# Patient Record
Sex: MALE | Race: BLACK OR AFRICAN AMERICAN
[De-identification: names, ages, dates, MRNs, and addresses within clinical notes are randomized per-mention and may not be internally consistent; named-entity substitution may affect disease eponyms.]

---

## 2017-12-28 ENCOUNTER — HOSPITAL ENCOUNTER (EMERGENCY)
Dept: HOSPITAL 72 - EMR | Age: 62
Discharge: HOME | End: 2017-12-28
Payer: MEDICARE

## 2017-12-28 VITALS — SYSTOLIC BLOOD PRESSURE: 148 MMHG | DIASTOLIC BLOOD PRESSURE: 92 MMHG

## 2017-12-28 VITALS — DIASTOLIC BLOOD PRESSURE: 79 MMHG | SYSTOLIC BLOOD PRESSURE: 142 MMHG

## 2017-12-28 VITALS — DIASTOLIC BLOOD PRESSURE: 92 MMHG | SYSTOLIC BLOOD PRESSURE: 148 MMHG

## 2017-12-28 VITALS — WEIGHT: 310 LBS | BODY MASS INDEX: 45.91 KG/M2 | HEIGHT: 69 IN

## 2017-12-28 VITALS — DIASTOLIC BLOOD PRESSURE: 96 MMHG | SYSTOLIC BLOOD PRESSURE: 164 MMHG

## 2017-12-28 DIAGNOSIS — I10: ICD-10-CM

## 2017-12-28 DIAGNOSIS — E66.01: ICD-10-CM

## 2017-12-28 DIAGNOSIS — R56.9: ICD-10-CM

## 2017-12-28 DIAGNOSIS — R06.00: Primary | ICD-10-CM

## 2017-12-28 DIAGNOSIS — E11.9: ICD-10-CM

## 2017-12-28 DIAGNOSIS — J45.909: ICD-10-CM

## 2017-12-28 LAB
ADD MANUAL DIFF: NO
ALBUMIN SERPL-MCNC: 3.6 G/DL (ref 3.4–5)
ALBUMIN/GLOB SERPL: 0.8 {RATIO} (ref 1–2.7)
ALP SERPL-CCNC: 78 U/L (ref 46–116)
ALT SERPL-CCNC: 20 U/L (ref 12–78)
ANION GAP SERPL CALC-SCNC: 9 MMOL/L (ref 5–15)
APPEARANCE UR: (no result)
APTT PPP: YELLOW S
AST SERPL-CCNC: 13 U/L (ref 15–37)
BASOPHILS NFR BLD AUTO: 1 % (ref 0–2)
BILIRUB SERPL-MCNC: 0.4 MG/DL (ref 0.2–1)
BUN SERPL-MCNC: 10 MG/DL (ref 7–18)
CALCIUM SERPL-MCNC: 7.8 MG/DL (ref 8.5–10.1)
CHLORIDE SERPL-SCNC: 102 MMOL/L (ref 98–107)
CK MB SERPL-MCNC: 4 NG/ML (ref 0–3.6)
CK SERPL-CCNC: 476 U/L (ref 26–308)
CO2 SERPL-SCNC: 26 MMOL/L (ref 21–32)
CREAT SERPL-MCNC: 1.2 MG/DL (ref 0.55–1.3)
EOSINOPHIL NFR BLD AUTO: 3.6 % (ref 0–3)
ERYTHROCYTE [DISTWIDTH] IN BLOOD BY AUTOMATED COUNT: 12 % (ref 11.6–14.8)
GLOBULIN SER-MCNC: 4.4 G/DL
GLUCOSE UR STRIP-MCNC: (no result) MG/DL
HCT VFR BLD CALC: 37.5 % (ref 42–52)
HGB BLD-MCNC: 12.2 G/DL (ref 14.2–18)
KETONES UR QL STRIP: NEGATIVE
LEUKOCYTE ESTERASE UR QL STRIP: NEGATIVE
LYMPHOCYTES NFR BLD AUTO: 38.9 % (ref 20–45)
MCV RBC AUTO: 95 FL (ref 80–99)
MONOCYTES NFR BLD AUTO: 3.2 % (ref 1–10)
NEUTROPHILS NFR BLD AUTO: 53.2 % (ref 45–75)
NITRITE UR QL STRIP: NEGATIVE
PH UR STRIP: 6 [PH] (ref 4.5–8)
PLATELET # BLD: 306 K/UL (ref 150–450)
POTASSIUM SERPL-SCNC: 3.7 MMOL/L (ref 3.5–5.1)
PROT UR QL STRIP: (no result)
RBC # BLD AUTO: 3.95 M/UL (ref 4.7–6.1)
SODIUM SERPL-SCNC: 137 MMOL/L (ref 136–145)
SP GR UR STRIP: 1.02 (ref 1–1.03)
UROBILINOGEN UR-MCNC: NORMAL MG/DL (ref 0–1)
WBC # BLD AUTO: 8.3 K/UL (ref 4.8–10.8)

## 2017-12-28 PROCEDURE — 96361 HYDRATE IV INFUSION ADD-ON: CPT

## 2017-12-28 PROCEDURE — 85025 COMPLETE CBC W/AUTO DIFF WBC: CPT

## 2017-12-28 PROCEDURE — 93005 ELECTROCARDIOGRAM TRACING: CPT

## 2017-12-28 PROCEDURE — 96375 TX/PRO/DX INJ NEW DRUG ADDON: CPT

## 2017-12-28 PROCEDURE — 96365 THER/PROPH/DIAG IV INF INIT: CPT

## 2017-12-28 PROCEDURE — 82550 ASSAY OF CK (CPK): CPT

## 2017-12-28 PROCEDURE — 71010: CPT

## 2017-12-28 PROCEDURE — 81003 URINALYSIS AUTO W/O SCOPE: CPT

## 2017-12-28 PROCEDURE — 84484 ASSAY OF TROPONIN QUANT: CPT

## 2017-12-28 PROCEDURE — 99284 EMERGENCY DEPT VISIT MOD MDM: CPT

## 2017-12-28 PROCEDURE — 80053 COMPREHEN METABOLIC PANEL: CPT

## 2017-12-28 PROCEDURE — 87086 URINE CULTURE/COLONY COUNT: CPT

## 2017-12-28 PROCEDURE — 36415 COLL VENOUS BLD VENIPUNCTURE: CPT

## 2017-12-28 PROCEDURE — 82553 CREATINE MB FRACTION: CPT

## 2017-12-28 PROCEDURE — 80307 DRUG TEST PRSMV CHEM ANLYZR: CPT

## 2017-12-28 NOTE — EMERGENCY ROOM REPORT
History of Present Illness


General


Chief Complaint:  Dyspnea/Respdistress


Source:  Patient, EMS





Present Illness


HPI


This is a 62-year-old male with history of high blood pressure diabetes and 

asthma.  He present with chief complaint of shortness of breath.  Onset 

tonight.  Is a chronic problem for him.  He said he get it every day.  No 

nausea no vomiting.  No chest pain.  Worse with exertion.  Similar symptom in 

the past.  He was admitted to [facility] and beginning of the month and said that 

they kept him and did an angiogram.  Said that there is no blockage to his 

heart.  Denies any nausea vomiting.  Denies any fever or chills.  Slightly 

worse tonight and has vertigo with room spinning..  Does have a history of 

seizure but not on any medication.


Allergies:  


Coded Allergies:  


     No Known Allergies (Unverified , 12/28/17)





Patient History


Past Medical History:  see triage record, old chart reviewed, DM, HTN, asthma


Past Surgical History:  other


Pertinent Family History:  none


Social History:  Denies: smoking, alcohol use, drug use


Immunizations:  other


Reviewed Nursing Documentation:  PMH: Agreed, PSxH: Agreed





Nursing Documentation-PMH


Past Medical History:  No History, Except For


Hx Hypertension:  Yes


Hx Asthma:  Yes





Review of Systems


Eye:  Denies: eye pain, blurred vision


ENT:  Denies: ear pain, nose congestion, throat swelling


Respiratory:  Reports: shortness of breath, Denies: cough


Cardiovascular:  Denies: chest pain, palpitations


Gastrointestinal:  Denies: abdominal pain, diarrhea, nausea, vomiting


Musculoskeletal:  Denies: back pain, joint pain


Skin:  Denies: rash


Neurological:  Denies: headache, numbness


Endocrine:  Denies: increased thirst, increased urine


Hematologic/Lymphatic:  Denies: easy bruising


All Other Systems:  negative except mentioned in HPI





Physical Exam





Vital Signs








  Date Time  Temp Pulse Resp B/P (MAP) Pulse Ox O2 Delivery O2 Flow Rate FiO2


 


12/28/17 02:18  89 16 154/96 98 Room Air  


 


12/28/17 02:29 97.6       





vitals with high blood pressure


Sp02 EP Interpretation:  reviewed, normal


General Appearance:  well appearing, no apparent distress, alert


Head:  normocephalic, atraumatic


Eyes:  bilateral eye PERRL, bilateral eye EOMI


ENT:  hearing grossly normal, normal pharynx


Neck:  full range of motion, supple, no meningismus


Respiratory:  chest non-tender, lungs clear, normal breath sounds


Cardiovascular #1:  regular rate, rhythm, no murmur


Gastrointestinal:  normal bowel sounds, non tender, no mass, no organomegaly, 

no bruit, non-distended


Musculoskeletal:  back normal, gait/station normal, normal range of motion


Psychiatric:  mood/affect normal


Skin:  warm/dry





Medical Decision Making


Diagnostic Impression:  


 Primary Impression:  


 Dyspnea


 Qualified Codes:  R06.00 - Dyspnea, unspecified


 Additional Impressions:  


 Seizure


 Morbid obesity with BMI of 45.0-49.9, adult


ER Course


Patient presents with shortness of breath.  X-rays unremarkable.  When he 

arrived he started having a tonic-clonic seizure activity lasting about a 

minute.  No incontinence of bowel or urine.  No tongue laceration.  It broke 

with Ativan.  Troponin negative.  EKG unremarkable.  I see no evidence of ACS 

the, and dissection to name a few.  The fact that he had an angiogram that was 

negative less than 30 years ago is reassuring.  We'll discharge home.  Symptoms 

resolved.  His dyspnea may be secondary to his obesity.  Could be sleep apnea.


Lab Results Impression


labs unremarkable


EKG Diagnostic Results


Rate:  normal


Rhythm:  NSR


ST Segments:  no acute changes


ASA given to the pt in ED:  Yes





Rhythm Strip Diag. Results


Rhythm Strip Time:  05:26


EP Interpretation:  yes


Rate:  100


Rhythm:  NSR, no PVC's, no ectopy





Chest X-Ray Diagnostic Results


Chest X-Ray Diagnostic Results :  


   Chest X-Ray Ordered:  Yes


   # of Views/Limited/Complete:  1 View


   Indication:  Chest Pain


   Interpretation:  no consolidation, no effusion, no pneumothorax, no acute 

cardiopulmonary disease


   Impression:  No acute disease


   Electronically Signed by:  Jose Laughlin MD





Last Vital Signs








  Date Time  Temp Pulse Resp B/P (MAP) Pulse Ox O2 Delivery O2 Flow Rate FiO2


 


12/28/17 02:29 97.6 114 16 164/96 98 Room Air  








Status:  improved


Disposition:  HOME, SELF-CARE


Condition:  Stable


Scripts


Levetiracetam (KEPPRA) 500 Mg Tablet


500 MG ORAL EVERY 12 HOURS, #60 TAB 0 Refills


   Prov: JOSE LAUGHLIN M.D.         12/28/17


Referrals:  


NOT CHOSEN [facility]/MD,REFERRING (PCP)





Additional Instructions:  


Followup your DrJose Alfredo in 7 days.  Return if symptom worsen











JOSE LAUGHLIN M.D. Dec 28, 2017 05:28

## 2017-12-28 NOTE — DIAGNOSTIC IMAGING REPORT
Indication: Chest pain

 

Technique: One view of the chest

 

Comparison: none

 

Findings: Body habitus limits evaluation. The heart is enlarged. The lungs and

pleural spaces are clear. Heart size is normal.

 

Impression: No acute process

## 2018-01-09 NOTE — CARDIOLOGY REPORT
--------------- APPROVED REPORT --------------





EKG Measurement

Heart [id number]

LA [id number]

[id number]

[id number]

[id number]





Sinus tachycardia with premature atrial complexes

LAS

Incomplete right bundle branch block

Abnormal ECG

## 2018-02-22 ENCOUNTER — HOSPITAL ENCOUNTER (EMERGENCY)
Dept: HOSPITAL 72 - EMR | Age: 63
Discharge: HOME | End: 2018-02-22
Payer: MEDICARE

## 2018-02-22 VITALS — DIASTOLIC BLOOD PRESSURE: 89 MMHG | SYSTOLIC BLOOD PRESSURE: 158 MMHG

## 2018-02-22 VITALS — BODY MASS INDEX: 39.25 KG/M2 | HEIGHT: 69 IN | WEIGHT: 265 LBS

## 2018-02-22 VITALS — DIASTOLIC BLOOD PRESSURE: 86 MMHG | SYSTOLIC BLOOD PRESSURE: 150 MMHG

## 2018-02-22 DIAGNOSIS — J45.909: ICD-10-CM

## 2018-02-22 DIAGNOSIS — K21.9: ICD-10-CM

## 2018-02-22 DIAGNOSIS — M25.561: Primary | ICD-10-CM

## 2018-02-22 DIAGNOSIS — G89.29: ICD-10-CM

## 2018-02-22 DIAGNOSIS — I10: ICD-10-CM

## 2018-02-22 DIAGNOSIS — Z86.73: ICD-10-CM

## 2018-02-22 DIAGNOSIS — F32.9: ICD-10-CM

## 2018-02-22 DIAGNOSIS — I25.2: ICD-10-CM

## 2018-02-22 PROCEDURE — 96372 THER/PROPH/DIAG INJ SC/IM: CPT

## 2018-02-22 PROCEDURE — 99283 EMERGENCY DEPT VISIT LOW MDM: CPT

## 2018-02-22 NOTE — EMERGENCY ROOM REPORT
History of Present Illness


General


Chief Complaint:  Pain


Source:  Patient, Medical Record





Present Illness


HPI


62-year-old male presents ED for evaluation.  Patient presented by EMS with 

right knee pain.  Denies any recent injury.  Pain is chronic for the last one 

year but worse over the last several weeks.  Patient was told that he may be a 

knee replacement.  Pain is throbbing, 9/10, nonradiating.  Notes pain but is 

able to bear weight.  No other aggravating or relieving factors.  Denies any 

other associated symptoms


Allergies:  


Coded Allergies:  


     No Known Allergies (Unverified , 8/21/15)





Patient History


Past Medical History:  HTN, asthma, GERD, CVA/TIA, psych hx


Past Surgical History:  none


Pertinent Family History:  none


Social History:  Denies: smoking, alcohol use, drug use


Immunizations:  UTD


Reviewed Nursing Documentation:  PMH: Agreed, PSxH: Agreed





Nursing Documentation-PMH


Past Medical History:  No History, Except For


Hx Cardiac Problems:  Yes - MI


Hx Hypertension:  Yes


Hx Pacemaker:  No


Hx Asthma:  Yes


Hx COPD:  No


Hx Diabetes:  Yes


Hx Cancer:  No


Hx Gastrointestinal Problems:  Yes - GERD


Hx Dialysis:  No


History Of Psychiatric Problem:  Yes - Depression


Hx Neurological Problems:  Yes


Hx Cerebrovascular Accident:  No


Hx Transient Ischemic Attacks:  Yes


Hx Seizures:  No


Hx Vertigo:  Yes


Hx Dizziness:  Yes


Hx Weakness:  Yes





Review of Systems


All Other Systems:  negative except mentioned in HPI





Physical Exam





Vital Signs








  Date Time  Temp Pulse Resp B/P (MAP) Pulse Ox O2 Delivery O2 Flow Rate FiO2


 


2/22/18 09:25 97.8 75 16 158/89 100 Room Air  





 97.9       








Sp02 EP Interpretation:  reviewed, normal


General Appearance:  no apparent distress, alert, GCS 15, non-toxic, obese


Head:  normocephalic


Eyes:  bilateral eye normal inspection, bilateral eye PERRL


ENT:  normal ENT inspection


Neck:  normal inspection


Respiratory:  normal inspection


Cardiovascular #1:  normal inspection


Gastrointestinal:  normal inspection


Rectal:  deferred


Genitourinary:  no CVA tenderness


Musculoskeletal:  normal range of motion, tender - R knee


Neurologic:  alert, oriented x3, responsive, motor strength/tone normal, 

sensory intact, speech normal


Psychiatric:  normal inspection


Skin:  normal inspection


Lymphatic:  normal inspection





Medical Decision Making


Diagnostic Impression:  


 Primary Impression:  


 Knee pain


 Qualified Codes:  M25.561 - Pain in right knee; G89.29 - Other chronic pain


ER Course


Hospital Course 


62-year-old male presents to ED complaining of R knee pain no trauma 





Differential diagnoses include: Fracture, dislocation, sprain, contusion, 

bursitis





Clinical course


Patient placed on stretcher.  After initial history, physical exam reveals an 

obese male in no acute distress.  There is some tenderness to the right knee.  

Full range of motion.





Given obese nature, history of chronic knee pain and recommendation for knee 

replacement I do not believe patient requires further workup at this time.  No 

recent trauma to require repeat imaging





Given pain meds here.  Ace wrap applied.  Recommend followup with orthopedics 

to consider knee replacement





Diagnosis - knee pain





stable and discharged to home with prescription for Norco. apply ice. elevate.  

Followup with PMD.  Return to ED if symptoms recur or worsen





Last Vital Signs








  Date Time  Temp Pulse Resp B/P (MAP) Pulse Ox O2 Delivery O2 Flow Rate FiO2


 


2/22/18 10:25 97.9       


 


2/22/18 10:25  83 16 150/86 100 Room Air  








Status:  improved


Disposition:  HOME, SELF-CARE


Condition:  Stable


Scripts


Hydrocodone Bit/Acetaminophen 5-325* (NORCO 5-325*) 1 Each Tablet


1 TAB ORAL Q6H Y for For Pain, #20 TAB 0 Refills


   Prov: FIFI GONZALEZ M.D.         2/22/18


Patient Instructions:  Knee Pain, Easy-to-Read











FIFI GONZALEZ M.D. Feb 22, 2018 10:51

## 2018-08-17 ENCOUNTER — HOSPITAL ENCOUNTER (INPATIENT)
Dept: HOSPITAL 72 - 2E | Age: 63
LOS: 4 days | Discharge: HOME | DRG: 206 | End: 2018-08-21
Payer: MEDICARE

## 2018-08-17 VITALS — WEIGHT: 260 LBS | BODY MASS INDEX: 37.22 KG/M2 | HEIGHT: 70 IN

## 2018-08-17 DIAGNOSIS — E78.5: ICD-10-CM

## 2018-08-17 DIAGNOSIS — Z98.61: ICD-10-CM

## 2018-08-17 DIAGNOSIS — G47.33: ICD-10-CM

## 2018-08-17 DIAGNOSIS — I10: ICD-10-CM

## 2018-08-17 DIAGNOSIS — I25.10: ICD-10-CM

## 2018-08-17 DIAGNOSIS — M94.0: Primary | ICD-10-CM

## 2018-08-17 DIAGNOSIS — F10.10: ICD-10-CM

## 2018-08-17 DIAGNOSIS — R07.9: ICD-10-CM

## 2018-08-17 DIAGNOSIS — J45.909: ICD-10-CM

## 2018-08-17 DIAGNOSIS — F41.9: ICD-10-CM

## 2018-08-17 DIAGNOSIS — E11.9: ICD-10-CM

## 2018-08-17 PROCEDURE — 82962 GLUCOSE BLOOD TEST: CPT

## 2018-08-17 PROCEDURE — 93306 TTE W/DOPPLER COMPLETE: CPT

## 2018-08-17 PROCEDURE — 87340 HEPATITIS B SURFACE AG IA: CPT

## 2018-08-17 PROCEDURE — 36415 COLL VENOUS BLD VENIPUNCTURE: CPT

## 2018-08-17 PROCEDURE — 86803 HEPATITIS C AB TEST: CPT

## 2018-08-17 PROCEDURE — 93005 ELECTROCARDIOGRAM TRACING: CPT

## 2018-08-17 PROCEDURE — 80061 LIPID PANEL: CPT

## 2018-08-17 PROCEDURE — 86709 HEPATITIS A IGM ANTIBODY: CPT

## 2018-08-17 PROCEDURE — 93017 CV STRESS TEST TRACING ONLY: CPT

## 2018-08-17 PROCEDURE — 94664 DEMO&/EVAL PT USE INHALER: CPT

## 2018-08-17 PROCEDURE — 84484 ASSAY OF TROPONIN QUANT: CPT

## 2018-08-17 PROCEDURE — 78452 HT MUSCLE IMAGE SPECT MULT: CPT

## 2018-08-17 PROCEDURE — 93970 EXTREMITY STUDY: CPT

## 2018-08-17 PROCEDURE — 84100 ASSAY OF PHOSPHORUS: CPT

## 2018-08-17 PROCEDURE — 80053 COMPREHEN METABOLIC PANEL: CPT

## 2018-08-17 PROCEDURE — 80048 BASIC METABOLIC PNL TOTAL CA: CPT

## 2018-08-17 PROCEDURE — 83036 HEMOGLOBIN GLYCOSYLATED A1C: CPT

## 2018-08-17 PROCEDURE — 71045 X-RAY EXAM CHEST 1 VIEW: CPT

## 2018-08-17 PROCEDURE — 83735 ASSAY OF MAGNESIUM: CPT

## 2018-08-17 PROCEDURE — 82977 ASSAY OF GGT: CPT

## 2018-08-17 PROCEDURE — 85025 COMPLETE CBC W/AUTO DIFF WBC: CPT

## 2018-08-17 PROCEDURE — 86705 HEP B CORE ANTIBODY IGM: CPT

## 2018-08-18 VITALS — SYSTOLIC BLOOD PRESSURE: 136 MMHG | DIASTOLIC BLOOD PRESSURE: 71 MMHG

## 2018-08-18 VITALS — SYSTOLIC BLOOD PRESSURE: 116 MMHG | DIASTOLIC BLOOD PRESSURE: 85 MMHG

## 2018-08-18 VITALS — SYSTOLIC BLOOD PRESSURE: 141 MMHG | DIASTOLIC BLOOD PRESSURE: 75 MMHG

## 2018-08-18 VITALS — SYSTOLIC BLOOD PRESSURE: 110 MMHG | DIASTOLIC BLOOD PRESSURE: 65 MMHG

## 2018-08-18 VITALS — SYSTOLIC BLOOD PRESSURE: 123 MMHG | DIASTOLIC BLOOD PRESSURE: 75 MMHG

## 2018-08-18 VITALS — SYSTOLIC BLOOD PRESSURE: 133 MMHG | DIASTOLIC BLOOD PRESSURE: 76 MMHG

## 2018-08-18 LAB
ADD MANUAL DIFF: NO
ALBUMIN SERPL-MCNC: 3.3 G/DL (ref 3.4–5)
ALBUMIN/GLOB SERPL: 1 {RATIO} (ref 1–2.7)
ALP SERPL-CCNC: 78 U/L (ref 46–116)
ALT SERPL-CCNC: 101 U/L (ref 12–78)
ANION GAP SERPL CALC-SCNC: 9 MMOL/L (ref 5–15)
AST SERPL-CCNC: 115 U/L (ref 15–37)
BASOPHILS NFR BLD AUTO: 0.8 % (ref 0–2)
BILIRUB SERPL-MCNC: 0.6 MG/DL (ref 0.2–1)
BUN SERPL-MCNC: 12 MG/DL (ref 7–18)
CALCIUM SERPL-MCNC: 8.3 MG/DL (ref 8.5–10.1)
CHLORIDE SERPL-SCNC: 105 MMOL/L (ref 98–107)
CO2 SERPL-SCNC: 28 MMOL/L (ref 21–32)
CREAT SERPL-MCNC: 1.3 MG/DL (ref 0.55–1.3)
EOSINOPHIL NFR BLD AUTO: 3.5 % (ref 0–3)
ERYTHROCYTE [DISTWIDTH] IN BLOOD BY AUTOMATED COUNT: 11.5 % (ref 11.6–14.8)
GLOBULIN SER-MCNC: 3.4 G/DL
HCT VFR BLD CALC: 33.5 % (ref 42–52)
HGB BLD-MCNC: 11.2 G/DL (ref 14.2–18)
LYMPHOCYTES NFR BLD AUTO: 32 % (ref 20–45)
MCV RBC AUTO: 94 FL (ref 80–99)
MONOCYTES NFR BLD AUTO: 6.9 % (ref 1–10)
NEUTROPHILS NFR BLD AUTO: 56.9 % (ref 45–75)
PHOSPHATE SERPL-MCNC: 3.5 MG/DL (ref 2.5–4.9)
PLATELET # BLD: 167 K/UL (ref 150–450)
POTASSIUM SERPL-SCNC: 3.8 MMOL/L (ref 3.5–5.1)
RBC # BLD AUTO: 3.58 M/UL (ref 4.7–6.1)
SODIUM SERPL-SCNC: 141 MMOL/L (ref 136–145)
WBC # BLD AUTO: 6.2 K/UL (ref 4.8–10.8)

## 2018-08-18 RX ADMIN — HEPARIN SODIUM SCH UNITS: 5000 INJECTION INTRAVENOUS; SUBCUTANEOUS at 20:54

## 2018-08-18 RX ADMIN — METFORMIN HYDROCHLORIDE SCH MG: 500 TABLET ORAL at 08:38

## 2018-08-18 RX ADMIN — HYDROCODONE BITARTRATE AND ACETAMINOPHEN PRN TAB: 10; 325 TABLET ORAL at 06:42

## 2018-08-18 RX ADMIN — INSULIN ASPART SCH UNITS: 100 INJECTION, SOLUTION INTRAVENOUS; SUBCUTANEOUS at 11:46

## 2018-08-18 RX ADMIN — INSULIN ASPART SCH UNITS: 100 INJECTION, SOLUTION INTRAVENOUS; SUBCUTANEOUS at 17:38

## 2018-08-18 RX ADMIN — INSULIN ASPART SCH UNITS: 100 INJECTION, SOLUTION INTRAVENOUS; SUBCUTANEOUS at 20:53

## 2018-08-18 RX ADMIN — LISINOPRIL SCH MG: 10 TABLET ORAL at 08:39

## 2018-08-18 RX ADMIN — NITROGLYCERIN SCH INCH: 20 OINTMENT TOPICAL at 20:52

## 2018-08-18 RX ADMIN — GLIPIZIDE SCH MG: 5 TABLET ORAL at 06:36

## 2018-08-18 RX ADMIN — METFORMIN HYDROCHLORIDE SCH MG: 500 TABLET ORAL at 17:32

## 2018-08-18 RX ADMIN — HEPARIN SODIUM SCH UNITS: 5000 INJECTION INTRAVENOUS; SUBCUTANEOUS at 06:36

## 2018-08-18 RX ADMIN — INSULIN ASPART SCH UNITS: 100 INJECTION, SOLUTION INTRAVENOUS; SUBCUTANEOUS at 06:35

## 2018-08-18 RX ADMIN — ASPIRIN 81 MG SCH MG: 81 TABLET ORAL at 08:38

## 2018-08-18 RX ADMIN — HEPARIN SODIUM SCH UNITS: 5000 INJECTION INTRAVENOUS; SUBCUTANEOUS at 14:00

## 2018-08-18 RX ADMIN — GLIPIZIDE SCH MG: 5 TABLET ORAL at 16:43

## 2018-08-18 NOTE — HISTORY & PHYSICAL
History and Physical


History & Physicial


Dictated for Int med-Dr Sepulveda no. [id number].











Lui Bustillo MD Aug 18, 2018 18:23

## 2018-08-18 NOTE — HISTORY AND PHYSICAL REPORT
DATE OF ADMISSION:  08/18/2018



CHIEF COMPLAINT:  The patient is a 62-year-old  male with

history of coronary artery disease, who presents with chief complaint of

chest pain.



HISTORY OF PRESENT ILLNESS:  The patient has a history of coronary artery

disease.  The patient is status post myocardial infarction in 2016.  The

patient had a cardiac catheterization, but no stents placed in 2016.



The patient states history of present illness began one week ago.  The

patient began to experience chest pain.  Chest pain is described as

substernal.  There is no radiation to the jaw or to the arm.  It has been

on and off.  The pain is sharp and burning in nature.



The patient initially presented to [facility].  The

patient was transferred to [facility] for insurance purposes.

The patient is admitted for chest pain to rule out acute coronary

syndrome.



REVIEW OF SYSTEMS:  CONSTITUTIONAL:  The patient denies weight loss or

weight gain.  The patient denies fevers or chills.  HEENT:  The patient

denies ear or throat pain.  The patient denies headache.  CARDIOVASCULAR:

The patient complains of chest pain as above.  The patient denies

palpitations.  CHEST:  The patient complains of shortness of breath.  The

patient denies wheezes.  ABDOMINAL:  The patient denies nausea, vomiting,

diarrhea, or constipation.  GENITOURINARY:  The patient denies dysuria or

increased frequency of urination.  NEUROMUSCULAR:  The patient denies

seizures or generalized weakness.



PAST MEDICAL HISTORY:  Significant for:



1. Type 2 diabetes.

2. Hypercholesterolemia.

3. Hypertension.

4. Coronary artery disease as above.

5. Asthma.



PAST SURGICAL HISTORY:  Significant for laparoscopic

cholecystectomy.



CURRENT MEDICATIONS:

1. Lisinopril 10 mg p.o. daily.

2. Glipizide 10 mg p.o. twice daily.

3. Metformin 1000 mg p.o. twice daily.

4. Metoprolol of an unknown dose twice daily.

5. Baclofen 20 mg p.o. 3 times daily.

6. Lipitor 20 mg p.o. daily.

7. Lasix 20 mg p.o. daily.

8. Aspirin 325 mg p.o. daily.

9. Lantus insulin of an unknown dose daily.

10. Albuterol nebulized.



ALLERGIES:  No known drug allergies.



SOCIAL HISTORY:  The patient is a .  The patient denies tobacco use.

The patient admits to occasional alcohol use.  The patient is retired.



PHYSICAL EXAMINATION:

VITAL SIGNS:  Temperature 98.3, respirations 18, pulse 84, blood pressure

110/65.

GENERAL:  The patient is a well-developed, well-nourished 

male, in no apparent distress.

HEENT:  Eyes, pupils equal and responsive to light and accommodation.

Extraocular movements are intact.

NECK:  Supple.  No lymphadenopathy.

CHEST:  Lungs are clear to auscultation bilaterally without wheezes or

rales.

CARDIOVASCULAR:  Regular rate.  S1, S2 normal without murmurs, rubs, or

gallops.

ABDOMEN:  Soft, nontender, and nondistended.  Positive bowel sounds.  No

evidence of hepatosplenomegaly.  Currently, no rebound or guarding

noted.

EXTREMITIES:  Negative for clubbing, cyanosis, or edema.

RECTAL:  Refused.

GENITAL:  Refused.

NEUROLOGIC:  Cranial nerves II through XII are grossly intact without focal

deficits.  Motor strength is 5/5 bilaterally intact.  Deep tendon reflexes

are 2+, plantar.



LABORATORY STUDIES:  WBC 6.0, hemoglobin 12.0, hematocrit 34.0, platelets

169,000.  Sodium 138, potassium 3.5, chloride 104, CO2 24, BUN 8,

creatinine 1.14, glucose elevated at 223.  Troponin less than 0.02.



ASSESSMENT:  This is a 62-year-old  male with:



1. Chest pain.

2. History of coronary artery disease.

3. Diabetes type 2.

4. Hypertension.

5. Hypercholesterolemia.

6. Asthma.



TREATMENT:

1. Chest pain/history of coronary artery disease.  A Cardiology

consultation has been obtained with Dr. Okeefe.  The patient will have

troponins performed every 8 hours for total of three.  A BNP is pending.

The patient will require cardiac stress test during this hospitalization

with history of coronary artery disease.

2. Diabetes type 2.  A NovoLog sliding scale has been instituted.

3. Hypertension.  Continue lisinopril and Norvasc as above.

4. Hypercholesterolemia.  Continue Lipitor as above.

5. Asthma.  The patient will be offered albuterol nebulized q.4 h.

p.r.n.









  ______________________________________________

  Lui Bustillo M.D.





DR:  Letty

D:  08/18/2018 18:09

T:  08/18/2018 18:18

JOB#:  [id number]

CC:

## 2018-08-18 NOTE — CONSULTATION
History of Present Illness


General


Date patient seen:  Aug 18, 2018


Time patient seen:  09:30


Chief Complaint:  chest pain, SOB


Referring physician:  dr Sepulveda


Reason for Consultation:  SOB/pulm consult





Present Illness


HPI


63 y/old male with PMH  significant for HTN, DM, CAD, questionable MI, 

hyperlipidemia, CVA, asthma initially presented to [facility] with 

complaint of chest pain.  


Upon evaluation   troponin was negative , EKG revealed normal sinus rhythm, no  

acute ischemic changes 


Noted elevated d-dimer 


Chest x-ray was unremarkable 


Patient was transferred to [facility] for further management 


Upon questioning patient complained of intermittent chest pain with associated 

shortness of breath.  


He reported history of MI.  


He did have   cardiac catheterization  in the past, but no stents were placed 

as per patient.   


Pulse oximetry stable on room air, no tachypnea, no tachycardia 


Noted elevated   LFT 


Patient  denied smoking and illicit street drugs, but  admits to   alcohol  use 

2- 3 times a week 


Patient was admitted to telemetry floor for further management


Allergies:  


Coded Allergies:  


     No Known Allergies (Unverified , 8/21/15)





Medication History


Scheduled


Albuterol Sulfate (Ventolin Hfa), 1 PUFF INH EVERY 6 HOURS, (Reported)


Albuterol Sulfate* (Albuterol Sulfate Hhn*), 2.5 MG HHN Q6HRT


Amlodipine Besylate (Norvasc), 10 MG ORAL DAILY, (Reported)


Aspirin* (Aspir-Low*), 81 MG ORAL DAILY, (Reported)


Atorvastatin (Lipitor), 40 MG ORAL BEDTIME, (Reported)


Baclofen* (Baclofen*), 10 MG ORAL THREE TIMES A DAY, (Reported)


Gabapentin* (Gabapentin*), 800 MG ORAL THREE TIMES A DAY, (Reported)


Glipizide* (Glipizide*), 5 MG ORAL BIDAC, (Reported)


Lidocaine HCL 2% Jelly* (Lidocaine Jelly 2%*), 5 ML TOPIC DAILY, (Reported)


Lisinopril (Lisinopril*), 10 MG ORAL DAILY, (Reported)


Metformin Hcl* (Metformin Hcl*), 500 MG ORAL TWICE A DAY, (Reported)





Scheduled PRN


Acetaminophen* (Tylenol Extra Strength*), 500 MG ORAL Q6H PRN for Mild Pain/

Temp > 100.5, (Reported)


Diphenhydramine HCl (Benadryl), 25 MG PO BID PRN for Itching, (Reported)


Hydrocodone Bit/Acetaminophen * (Norco *), 1 TAB ORAL Q6H PRN for 

For Pain, (Reported)





Miscellaneous Medications


Famotidine (Pepcid Ac), 20 MG PO, (Reported)





Discontinued Medications


Amlodipine Besylate (Norvasc), 5 MG ORAL DAILY


   Discontinued Reason: Medication dose changed


Atorvastatin Calcium* (Lipitor*), 80 MG ORAL BEDTIME, (Reported)


   Discontinued Reason: Medication dose changed


Clopidogrel Bisulfate* (Plavix*), 75 MG ORAL DAILY


   Discontinued Reason: Pt stopped taking med


Gabapentin* (Gabapentin*), 300 MG ORAL THREE TIMES A DAY, (Reported)


   Discontinued Reason: Medication dose changed


Hydrocodone Bit/Acetaminophen 5-325* (Norco 5-325*), 1 TAB ORAL Q6H PRN for For 

Pain


   Discontinued Reason: Medication dose changed


Levetiracetam (Keppra), 500 MG ORAL EVERY 12 HOURS


   Discontinued Reason: Pt stopped taking med


Lisinopril (Lisinopril*), 20 MG ORAL DAILY, (Reported)


   Discontinued Reason: Medication dose changed


Metformin Hcl* (Metformin Hcl*), 1,000 MG ORAL DAILY, (Reported)


   Discontinued Reason: Medication dose changed


Metoprolol Tartrate* (Metoprolol Tartrate*), 12.5 MG ORAL BID, (Reported)


   Discontinued Reason: Pt stopped taking med


Omeprazole (Omeprazole), 20 MG ORAL DAILY, (Reported)


   Discontinued Reason: Pt stopped taking med


Simethicone* (Simethicone*), 80 MG ORAL Q8H PRN for Gas, (Reported)


   Discontinued Reason: Pt stopped taking med





Patient History


History Provided By:  Patient


Healthcare decision maker





Resuscitation status


Full Code


Advanced Directive on File


No





Past Medical/Surgical History


Past Medical/Surgical History:  


(1) Hyperlipidemia


(2) HTN (hypertension)


(3) DM (diabetes mellitus)


(4) CAD (coronary artery disease)





Review of Systems


Constitutional:  Reports: weakness


Eye:  Reports: blurred vision


ENT:  Reports: other - dental problems


Cardiovascular:  Reports: see HPI


Gastrointestinal:  Reports: constipation


Genitourinary:  Reports: no symptoms


Musculoskeletal:  Reports: muscle pain


Skin:  Reports: no symptoms


Psychiatric:  Reports: other - anxiety


Neurological:  Reports: other - hx of TIA, CVA


Endocrine:  Reports: other - DM


Hematologic/Lymphatic:  Reports: no symptoms





Physical Exam


General Appearance:  no apparent distress, alert


Lines, tubes and drains:  peripheral


HEENT:  normocephalic, atraumatic, anicteric, mucous membranes moist


Neck:  non-tender, supple, normal inspection


Respiratory/Chest:  lungs clear - with moderate air exchange , no respiratory 

distress, no accessory muscle use


Cardiovascular/Chest:  normal rate, regular rhythm - SR on tele, distant heart 

sounds


Abdomen:  non tender, soft - obese


Extremities:  normal range of motion, non-tender, no calf tenderness, normal 

capillary refill


Skin Exam:  warm/dry


Neurologic:  no motor/sensory deficits, alert, oriented x 3, responsive


Musculoskeletal:  normal muscle bulk





Last 24 Hour Vital Signs








  Date Time  Temp Pulse Resp B/P (MAP) Pulse Ox O2 Delivery O2 Flow Rate FiO2


 


8/18/18 09:00      Room Air  


 


8/18/18 08:39    123/75    


 


8/18/18 08:38  77  123/75    


 


8/18/18 08:00 97.4 77 22 123/75 (91) 100   





 97.4       


 


8/18/18 07:57  72      


 


8/18/18 04:00 97.7 71 20 110/65 (80) 96   





 97.7       


 


8/18/18 04:00  73      


 


8/18/18 01:10 97.7 74 20 133/76 (95) 94   





 97.7       


 


8/18/18 01:01      Room Air  


 


8/18/18 01:00  73      

















Intake and Output  


 


 8/17/18 8/18/18





 19:00 07:00


 


Intake Total  480 ml


 


Output Total  425 ml


 


Balance  55 ml


 


  


 


Intake Oral  480 ml


 


Output Urine Total  425 ml











Laboratory Tests








Test


  8/18/18


04:45


 


White Blood Count


  6.2 K/UL


(4.8-10.8)


 


Red Blood Count


  3.58 M/UL


(4.70-6.10)  L


 


Hemoglobin


  11.2 G/DL


(14.2-18.0)  L


 


Hematocrit


  33.5 %


(42.0-52.0)  L


 


Mean Corpuscular Volume 94 FL (80-99)  


 


Mean Corpuscular Hemoglobin


  31.3 PG


(27.0-31.0)  H


 


Mean Corpuscular Hemoglobin


Concent 33.4 G/DL


(32.0-36.0)


 


Red Cell Distribution Width


  11.5 %


(11.6-14.8)  L


 


Platelet Count


  167 K/UL


(150-450)


 


Mean Platelet Volume


  9.9 FL


(6.5-10.1)


 


Neutrophils (%) (Auto)


  56.9 %


(45.0-75.0)


 


Lymphocytes (%) (Auto)


  32.0 %


(20.0-45.0)


 


Monocytes (%) (Auto)


  6.9 %


(1.0-10.0)


 


Eosinophils (%) (Auto)


  3.5 %


(0.0-3.0)  H


 


Basophils (%) (Auto)


  0.8 %


(0.0-2.0)


 


Sodium Level


  141 MMOL/L


(136-145)


 


Potassium Level


  3.8 MMOL/L


(3.5-5.1)


 


Chloride Level


  105 MMOL/L


()


 


Carbon Dioxide Level


  28 MMOL/L


(21-32)


 


Anion Gap


  9 mmol/L


(5-15)


 


Blood Urea Nitrogen


  12 mg/dL


(7-18)


 


Creatinine


  1.3 MG/DL


(0.55-1.30)


 


Estimat Glomerular Filtration


Rate > 60 mL/min


(>60)


 


Glucose Level


  284 MG/DL


()  H


 


Hemoglobin A1c


  8.0 %


(4.3-6.0)  H


 


Calcium Level


  8.3 MG/DL


(8.5-10.1)  L


 


Phosphorus Level


  3.5 MG/DL


(2.5-4.9)


 


Magnesium Level


  1.8 MG/DL


(1.8-2.4)


 


Total Bilirubin


  0.6 MG/DL


(0.2-1.0)


 


Aspartate Amino Transf


(AST/SGOT) 115 U/L


(15-37)  H


 


Alanine Aminotransferase


(ALT/SGPT) 101 U/L


(12-78)  H


 


Alkaline Phosphatase


  78 U/L


()


 


Troponin I


  0.000 ng/mL


(0.000-0.056)


 


Total Protein


  6.7 G/DL


(6.4-8.2)


 


Albumin


  3.3 G/DL


(3.4-5.0)  L


 


Globulin 3.4 g/dL  


 


Albumin/Globulin Ratio 1.0 (1.0-2.7)  








Height (Feet):  5


Height (Inches):  10.00


Weight (Pounds):  260


Medications





Current Medications








 Medications


  (Trade)  Dose


 Ordered  Sig/Mirian


 Route


 PRN Reason  Start Time


 Stop Time Status Last Admin


Dose Admin


 


 Acetaminophen


  (Tylenol)  650 mg  Q6H  PRN


 ORAL


 Mild Pain/Temp > 100.5  8/18/18 03:15


 9/17/18 03:14   


 


 


 Acetaminophen/


 Hydrocodone Bitart


  (Norco 10/325)  1 tab  Q6HR  PRN


 ORAL


 For Pain  8/18/18 03:15


 8/25/18 03:14  8/18/18 06:42


 


 


 Albuterol Sulfate


  (Proventil MDI)  2 puff  Q4H  PRN


 INH


 Shortness of Breath  8/18/18 03:15


 9/17/18 03:14   


 


 


 Amlodipine


 Besylate


  (Norvasc)  10 mg  DAILY


 ORAL


   8/18/18 09:00


 9/17/18 08:59  8/18/18 08:38


 


 


 Aspirin


  (ASA)  81 mg  DAILY


 ORAL


   8/18/18 09:00


 9/17/18 08:59  8/18/18 08:38


 


 


 Atorvastatin


 Calcium


  (Lipitor)  40 mg  BEDTIME


 ORAL


   8/18/18 21:00


 9/17/18 20:59   


 


 


 Baclofen


  (Lioresal)  10 mg  THREE TIMES A DAY  PRN


 ORAL


 Muscle Spasm  8/18/18 03:15


 9/17/18 03:14   


 


 


 Dextrose


  (Dextrose 50%)  25 ml  STAT  PRN


 IV


 Hypoglycemia  8/18/18 03:15


 9/17/18 03:14   


 


 


 Dextrose


  (Dextrose 50%)  50 ml  STAT  PRN


 IV


 Hypoglycemia  8/18/18 03:15


 9/17/18 03:14   


 


 


 Diphenhydramine


 HCl


  (Benadryl)  25 mg  BID  PRN


 ORAL


 Itching  8/18/18 03:15


 9/17/18 03:14   


 


 


 Famotidine


  (Pepcid)  20 mg  DAILY


 ORAL


   8/18/18 09:00


 9/17/18 08:59  8/18/18 08:38


 


 


 Gabapentin


  (Neurontin)  800 mg  THREE TIMES A  DAY


 ORAL


   8/18/18 09:00


 9/17/18 08:59  8/18/18 08:37


 


 


 Glipizide


  (Glucotrol)  5 mg  BIAC


 ORAL


   8/18/18 06:30


 9/17/18 06:29  8/18/18 06:36


 


 


 Heparin Sodium


  (Porcine)


  (Heparin 5000


 units/ml)  5,000 units  EVERY 8  HOURS


 SUBQ


   8/18/18 06:00


 9/17/18 05:59  8/18/18 06:36


 


 


 Insulin Aspart


  (NovoLOG)    BEFORE MEALS AND  HS


 SUBQ


   8/18/18 06:30


 9/17/18 06:29  8/18/18 06:35


 


 


 Lidocaine


  (Xylocaine 5%


 cream)  1 applic  PRN  PRN


 TOPIC


 Pain Scale (3-5)  8/18/18 03:15


 9/17/18 03:14   


 


 


 Lisinopril


  (Zestril)  10 mg  DAILY


 ORAL


   8/18/18 09:00


 9/17/18 08:59  8/18/18 08:39


 


 


 Metformin HCl


  (Glucophage)  500 mg  BID


 ORAL


   8/18/18 09:00


 9/17/18 08:59  8/18/18 08:38


 


 


 Ondansetron HCl


  (Zofran)  4 mg  Q4HR  PRN


 IVP


 Nausea & Vomiting  8/18/18 03:15


 9/17/18 03:14   


 











Assessment/Plan


Status Narrative


ASSESSMENT 


chest pain rule out ACS


asthma


elevated D-dimer  


CAD with ? hx of MI  


HTN


hyperlipidemia


diabetes mellitus


transaminitis


ETOH use , possible abuse





PLAN OF CARE


telemetry


serial troponin 


EKG 


ECHO


cardio evaluation 


Venous duplex BLE stat


O2 prn titrate


pulmonary toilet 


CXR from [facility] stable  


cardio eval pending 


aspirin


Nitroglycerin prn for pain


BP management with CCB and ACE, consider BB  per cardio discretion 


DVT, GI prophylaxis


BS management with oral anti-glycemic and SSI prn, [id number]   not at goal


continue statin 


trend  LFT 


check abdominal ultrasound and hepatitis panel


pain management 


bowel regimen 


supportive care   





case discussed and evaluated by supervising physician











Charleen Chery NP Aug 18, 2018 10:51

## 2018-08-19 VITALS — SYSTOLIC BLOOD PRESSURE: 137 MMHG | DIASTOLIC BLOOD PRESSURE: 63 MMHG

## 2018-08-19 VITALS — SYSTOLIC BLOOD PRESSURE: 148 MMHG | DIASTOLIC BLOOD PRESSURE: 73 MMHG

## 2018-08-19 VITALS — DIASTOLIC BLOOD PRESSURE: 74 MMHG | SYSTOLIC BLOOD PRESSURE: 152 MMHG

## 2018-08-19 VITALS — DIASTOLIC BLOOD PRESSURE: 72 MMHG | SYSTOLIC BLOOD PRESSURE: 126 MMHG

## 2018-08-19 VITALS — DIASTOLIC BLOOD PRESSURE: 75 MMHG | SYSTOLIC BLOOD PRESSURE: 139 MMHG

## 2018-08-19 VITALS — SYSTOLIC BLOOD PRESSURE: 143 MMHG | DIASTOLIC BLOOD PRESSURE: 77 MMHG

## 2018-08-19 LAB
ADD MANUAL DIFF: NO
ALBUMIN SERPL-MCNC: 3.4 G/DL (ref 3.4–5)
ALBUMIN/GLOB SERPL: 0.8 {RATIO} (ref 1–2.7)
ALP SERPL-CCNC: 84 U/L (ref 46–116)
ALT SERPL-CCNC: 78 U/L (ref 12–78)
ANION GAP SERPL CALC-SCNC: 7 MMOL/L (ref 5–15)
AST SERPL-CCNC: 36 U/L (ref 15–37)
BASOPHILS NFR BLD AUTO: 1.3 % (ref 0–2)
BILIRUB SERPL-MCNC: 0.5 MG/DL (ref 0.2–1)
BUN SERPL-MCNC: 12 MG/DL (ref 7–18)
CALCIUM SERPL-MCNC: 8.5 MG/DL (ref 8.5–10.1)
CHLORIDE SERPL-SCNC: 105 MMOL/L (ref 98–107)
CHOLEST SERPL-MCNC: 142 MG/DL (ref ?–200)
CO2 SERPL-SCNC: 28 MMOL/L (ref 21–32)
CREAT SERPL-MCNC: 1.1 MG/DL (ref 0.55–1.3)
EOSINOPHIL NFR BLD AUTO: 4 % (ref 0–3)
ERYTHROCYTE [DISTWIDTH] IN BLOOD BY AUTOMATED COUNT: 11.6 % (ref 11.6–14.8)
GAMMA GLUTAMYL TRANSPEPTIDASE: 180 U/L (ref 5–85)
GLOBULIN SER-MCNC: 4.1 G/DL
HCT VFR BLD CALC: 36.6 % (ref 42–52)
HDLC SERPL-MCNC: 36 MG/DL (ref 40–60)
HGB BLD-MCNC: 12.4 G/DL (ref 14.2–18)
LYMPHOCYTES NFR BLD AUTO: 26.3 % (ref 20–45)
MCV RBC AUTO: 94 FL (ref 80–99)
MONOCYTES NFR BLD AUTO: 4.9 % (ref 1–10)
NEUTROPHILS NFR BLD AUTO: 63.6 % (ref 45–75)
PLATELET # BLD: 187 K/UL (ref 150–450)
POTASSIUM SERPL-SCNC: 3.9 MMOL/L (ref 3.5–5.1)
RBC # BLD AUTO: 3.9 M/UL (ref 4.7–6.1)
SODIUM SERPL-SCNC: 140 MMOL/L (ref 136–145)
TRIGL SERPL-MCNC: 236 MG/DL (ref 30–150)
WBC # BLD AUTO: 7.9 K/UL (ref 4.8–10.8)

## 2018-08-19 RX ADMIN — HEPARIN SODIUM SCH UNITS: 5000 INJECTION INTRAVENOUS; SUBCUTANEOUS at 06:20

## 2018-08-19 RX ADMIN — LISINOPRIL SCH MG: 10 TABLET ORAL at 09:16

## 2018-08-19 RX ADMIN — INSULIN ASPART SCH UNITS: 100 INJECTION, SOLUTION INTRAVENOUS; SUBCUTANEOUS at 12:17

## 2018-08-19 RX ADMIN — NITROGLYCERIN SCH INCH: 20 OINTMENT TOPICAL at 18:20

## 2018-08-19 RX ADMIN — NITROGLYCERIN SCH INCH: 20 OINTMENT TOPICAL at 12:12

## 2018-08-19 RX ADMIN — GLIPIZIDE SCH MG: 5 TABLET ORAL at 18:19

## 2018-08-19 RX ADMIN — INSULIN ASPART SCH UNITS: 100 INJECTION, SOLUTION INTRAVENOUS; SUBCUTANEOUS at 22:05

## 2018-08-19 RX ADMIN — METFORMIN HYDROCHLORIDE SCH MG: 500 TABLET ORAL at 18:19

## 2018-08-19 RX ADMIN — ASPIRIN 81 MG SCH MG: 81 TABLET ORAL at 09:16

## 2018-08-19 RX ADMIN — INSULIN ASPART SCH UNITS: 100 INJECTION, SOLUTION INTRAVENOUS; SUBCUTANEOUS at 18:23

## 2018-08-19 RX ADMIN — HEPARIN SODIUM SCH UNITS: 5000 INJECTION INTRAVENOUS; SUBCUTANEOUS at 22:06

## 2018-08-19 RX ADMIN — METFORMIN HYDROCHLORIDE SCH MG: 500 TABLET ORAL at 09:15

## 2018-08-19 RX ADMIN — NITROGLYCERIN SCH INCH: 20 OINTMENT TOPICAL at 06:17

## 2018-08-19 RX ADMIN — HEPARIN SODIUM SCH UNITS: 5000 INJECTION INTRAVENOUS; SUBCUTANEOUS at 14:18

## 2018-08-19 RX ADMIN — INSULIN ASPART SCH UNITS: 100 INJECTION, SOLUTION INTRAVENOUS; SUBCUTANEOUS at 06:18

## 2018-08-19 RX ADMIN — MORPHINE SULFATE PRN MG: 2 INJECTION, SOLUTION INTRAMUSCULAR; INTRAVENOUS at 20:27

## 2018-08-19 RX ADMIN — GLIPIZIDE SCH MG: 5 TABLET ORAL at 06:16

## 2018-08-19 NOTE — CONSULTATION
History of Present Illness


General


Date patient seen:  Aug 19, 2018


Time patient seen:  16:17


Referring physician:  dr Sepulveda


Reason for Consultation:  SOB/pulm consult





Present Illness


HPI


62 year old Pt admitted to ER for shortness of breath and chest pain that 

occurred for x1 hour earlier on 8/17/2018. He received 4mg Morphine IVP, 324mg 

aspirin, 1 inch NITROBID ointment, and 2g magnesium in ER.


He has a hx of HTN, DM, CAD, pMI.  Troponin negative, EKG no ischemia. CXR 

clear.  Previous angiogram but no PCI/Stents. Vitals stable. Chest pain is 

described as substernal.  There is no radiation to the jaw or to the arm.  It 

has been on and off.  The pain is sharp and burning in nature.


Allergies:  


Coded Allergies:  


     No Known Allergies (Unverified , 8/21/15)





Medication History


Scheduled


Albuterol Sulfate (Ventolin Hfa), 1 PUFF INH EVERY 6 HOURS, (Reported)


Albuterol Sulfate* (Albuterol Sulfate Hhn*), 2.5 MG HHN Q6HRT


Amlodipine Besylate (Norvasc), 10 MG ORAL DAILY, (Reported)


Aspirin* (Aspir-Low*), 81 MG ORAL DAILY, (Reported)


Atorvastatin (Lipitor), 40 MG ORAL BEDTIME, (Reported)


Baclofen* (Baclofen*), 10 MG ORAL THREE TIMES A DAY, (Reported)


Gabapentin* (Gabapentin*), 800 MG ORAL THREE TIMES A DAY, (Reported)


Glipizide* (Glipizide*), 5 MG ORAL BIDAC, (Reported)


Lidocaine HCL 2% Jelly* (Lidocaine Jelly 2%*), 5 ML TOPIC DAILY, (Reported)


Lisinopril (Lisinopril*), 10 MG ORAL DAILY, (Reported)


Metformin Hcl* (Metformin Hcl*), 500 MG ORAL TWICE A DAY, (Reported)





Scheduled PRN


Acetaminophen* (Tylenol Extra Strength*), 500 MG ORAL Q6H PRN for Mild Pain/

Temp > 100.5, (Reported)


Diphenhydramine HCl (Benadryl), 25 MG PO BID PRN for Itching, (Reported)


Hydrocodone Bit/Acetaminophen * (Norco *), 1 TAB ORAL Q6H PRN for 

For Pain, (Reported)





Miscellaneous Medications


Famotidine (Pepcid Ac), 20 MG PO, (Reported)





Discontinued Medications


Amlodipine Besylate (Norvasc), 5 MG ORAL DAILY


   Discontinued Reason: Medication dose changed


Atorvastatin Calcium* (Lipitor*), 80 MG ORAL BEDTIME, (Reported)


   Discontinued Reason: Medication dose changed


Clopidogrel Bisulfate* (Plavix*), 75 MG ORAL DAILY


   Discontinued Reason: Pt stopped taking med


Gabapentin* (Gabapentin*), 300 MG ORAL THREE TIMES A DAY, (Reported)


   Discontinued Reason: Medication dose changed


Hydrocodone Bit/Acetaminophen 5-325* (Norco 5-325*), 1 TAB ORAL Q6H PRN for For 

Pain


   Discontinued Reason: Medication dose changed


Levetiracetam (Keppra), 500 MG ORAL EVERY 12 HOURS


   Discontinued Reason: Pt stopped taking med


Lisinopril (Lisinopril*), 20 MG ORAL DAILY, (Reported)


   Discontinued Reason: Medication dose changed


Metformin Hcl* (Metformin Hcl*), 1,000 MG ORAL DAILY, (Reported)


   Discontinued Reason: Medication dose changed


Metoprolol Tartrate* (Metoprolol Tartrate*), 12.5 MG ORAL BID, (Reported)


   Discontinued Reason: Pt stopped taking med


Omeprazole (Omeprazole), 20 MG ORAL DAILY, (Reported)


   Discontinued Reason: Pt stopped taking med


Simethicone* (Simethicone*), 80 MG ORAL Q8H PRN for Gas, (Reported)


   Discontinued Reason: Pt stopped taking med





Patient History


Healthcare decision maker





Resuscitation status


Full Code


Advanced Directive on File


No





Physical Exam


General Appearance:  no apparent distress, alert


Lines, tubes and drains:  peripheral


HEENT:  normocephalic, atraumatic


Neck:  non-tender, normal alignment, supple


Respiratory/Chest:  chest wall non-tender, lungs clear


Cardiovascular/Chest:  normal peripheral pulses, normal rate, regular rhythm


Abdomen:  normal bowel sounds, non tender


Extremities:  normal range of motion, non-tender


Skin Exam:  normal pigmentation


Neurologic:  CNs II-XII grossly normal





Last 24 Hour Vital Signs








  Date Time  Temp Pulse Resp B/P (MAP) Pulse Ox O2 Delivery O2 Flow Rate FiO2


 


8/19/18 16:00 97.7 85 22 148/73 (98) 97   





 97.7       


 


8/19/18 12:12    126/72    


 


8/19/18 12:00 97.7 84 20 126/72 (90) 99   





 97.7       


 


8/19/18 11:55  84      


 


8/19/18 09:16  78  137/63    


 


8/19/18 09:16    137/63    


 


8/19/18 08:31      Room Air  


 


8/19/18 08:25  78 18   Room Air  21


 


8/19/18 08:00 97.6 78 19 137/63 (87) 97   





 97.6       


 


8/19/18 07:57  87      


 


8/19/18 06:17    143/77    


 


8/19/18 04:00 97.6 71 22 143/77 (99) 95   





 97.6       


 


8/19/18 04:00  88      


 


8/19/18 00:00 98.1 87 20 139/75 (96) 95   





 98.1       


 


8/18/18 21:00      Room Air  


 


8/18/18 20:52    136/71    


 


8/18/18 20:00 97.8 72 22 141/75 (97) 97   





 97.8       


 


8/18/18 20:00  73      

















Intake and Output  


 


 8/18/18 8/19/18





 19:00 07:00


 


Intake Total 520 ml 300 ml


 


Output Total 800 ml 550 ml


 


Balance -280 ml -250 ml


 


  


 


Intake Oral 520 ml 300 ml


 


Output Urine Total 800 ml 550 ml











Laboratory Tests








Test


  8/18/18


17:45 8/19/18


05:00


 


Troponin I


  0.000 ng/mL


(0.000-0.056) 


 


 


White Blood Count


  


  7.9 K/UL


(4.8-10.8)


 


Red Blood Count


  


  3.90 M/UL


(4.70-6.10)  L


 


Hemoglobin


  


  12.4 G/DL


(14.2-18.0)  L


 


Hematocrit


  


  36.6 %


(42.0-52.0)  L


 


Mean Corpuscular Volume  94 FL (80-99)  


 


Mean Corpuscular Hemoglobin


  


  31.9 PG


(27.0-31.0)  H


 


Mean Corpuscular Hemoglobin


Concent 


  34.0 G/DL


(32.0-36.0)


 


Red Cell Distribution Width


  


  11.6 %


(11.6-14.8)


 


Platelet Count


  


  187 K/UL


(150-450)


 


Mean Platelet Volume


  


  11.1 FL


(6.5-10.1)  H


 


Neutrophils (%) (Auto)


  


  63.6 %


(45.0-75.0)


 


Lymphocytes (%) (Auto)


  


  26.3 %


(20.0-45.0)


 


Monocytes (%) (Auto)


  


  4.9 %


(1.0-10.0)


 


Eosinophils (%) (Auto)


  


  4.0 %


(0.0-3.0)  H


 


Basophils (%) (Auto)


  


  1.3 %


(0.0-2.0)


 


Sodium Level


  


  140 MMOL/L


(136-145)


 


Potassium Level


  


  3.9 MMOL/L


(3.5-5.1)


 


Chloride Level


  


  105 MMOL/L


()


 


Carbon Dioxide Level


  


  28 MMOL/L


(21-32)


 


Anion Gap


  


  7 mmol/L


(5-15)


 


Blood Urea Nitrogen


  


  12 mg/dL


(7-18)


 


Creatinine


  


  1.1 MG/DL


(0.55-1.30)


 


Estimat Glomerular Filtration


Rate 


  > 60 mL/min


(>60)


 


Glucose Level


  


  167 MG/DL


()  #H


 


Calcium Level


  


  8.5 MG/DL


(8.5-10.1)


 


Total Bilirubin


  


  0.5 MG/DL


(0.2-1.0)


 


Gamma Glutamyl Transpeptidase


  


  180 U/L (5-85)


H


 


Aspartate Amino Transf


(AST/SGOT) 


  36 U/L (15-37)


 


 


Alanine Aminotransferase


(ALT/SGPT) 


  78 U/L (12-78)


 


 


Alkaline Phosphatase


  


  84 U/L


()


 


Total Protein


  


  7.5 G/DL


(6.4-8.2)


 


Albumin


  


  3.4 G/DL


(3.4-5.0)


 


Globulin  4.1 g/dL  


 


Albumin/Globulin Ratio


  


  0.8 (1.0-2.7)


L


 


Triglycerides Level


  


  236 MG/DL


()  H


 


Cholesterol Level


  


  142 MG/DL (<


200)


 


LDL Cholesterol


  


  75 mg/dL


(<100)


 


HDL Cholesterol


  


  36 MG/DL


(40-60)  L


 


Cholesterol/HDL Ratio  3.9 (3.3-4.4)  


 


Hepatitis A IgM Antibody  Pending  


 


Hepatitis B Surface Antigen  Pending  


 


Hepatitis B Core IgM Antibody  Pending  


 


Hepatitis C Antibody  Pending  








Height (Feet):  5


Height (Inches):  10.00


Weight (Pounds):  260


Medications





Current Medications








 Medications


  (Trade)  Dose


 Ordered  Sig/Mirian


 Route


 PRN Reason  Start Time


 Stop Time Status Last Admin


Dose Admin


 


 Acetaminophen


  (Tylenol)  650 mg  Q6H  PRN


 ORAL


 Mild Pain/Temp > 100.5  8/18/18 03:15


 9/17/18 03:14   


 


 


 Acetaminophen/


 Hydrocodone Bitart


  (Norco 10/325)  1 tab  Q6HR  PRN


 ORAL


 For Pain  8/18/18 03:15


 8/25/18 03:14  8/18/18 06:42


 


 


 Albuterol/


 Ipratropium


  (Albuterol/


 Ipratropium)  3 ml  Q4H  PRN


 HHN


 Shortness of Breath  8/18/18 10:45


 8/23/18 10:44   


 


 


 Amlodipine


 Besylate


  (Norvasc)  10 mg  DAILY


 ORAL


   8/18/18 09:00


 9/17/18 08:59  8/19/18 09:16


 


 


 Aspirin


  (ASA)  81 mg  DAILY


 ORAL


   8/18/18 09:00


 9/17/18 08:59  8/19/18 09:16


 


 


 Atorvastatin


 Calcium


  (Lipitor)  40 mg  BEDTIME


 ORAL


   8/18/18 21:00


 9/17/18 20:59  8/18/18 20:51


 


 


 Baclofen


  (Lioresal)  10 mg  THREE TIMES A DAY  PRN


 ORAL


 Muscle Spasm  8/18/18 03:15


 9/17/18 03:14  8/19/18 14:10


 


 


 Dextrose


  (Dextrose 50%)  25 ml  STAT  PRN


 IV


 Hypoglycemia  8/18/18 03:15


 9/17/18 03:14   


 


 


 Dextrose


  (Dextrose 50%)  50 ml  STAT  PRN


 IV


 Hypoglycemia  8/18/18 03:15


 9/17/18 03:14   


 


 


 Diphenhydramine


 HCl


  (Benadryl)  25 mg  BID  PRN


 ORAL


 Itching  8/18/18 03:15


 9/17/18 03:14   


 


 


 Famotidine


  (Pepcid)  20 mg  DAILY


 ORAL


   8/18/18 09:00


 9/17/18 08:59  8/19/18 09:16


 


 


 Gabapentin


  (Neurontin)  800 mg  THREE TIMES A  DAY


 ORAL


   8/18/18 09:00


 9/17/18 08:59  8/19/18 12:12


 


 


 Glipizide


  (Glucotrol)  5 mg  BIAC


 ORAL


   8/18/18 06:30


 9/17/18 06:29  8/19/18 06:16


 


 


 Heparin Sodium


  (Porcine)


  (Heparin 5000


 units/ml)  5,000 units  EVERY 8  HOURS


 SUBQ


   8/18/18 06:00


 9/17/18 05:59  8/19/18 14:18


 


 


 Insulin Aspart


  (NovoLOG)    BEFORE MEALS AND  HS


 SUBQ


   8/18/18 06:30


 9/17/18 06:29  8/19/18 12:17


 


 


 Lidocaine


  (Xylocaine 5%


 cream)  1 applic  PRN  PRN


 TOPIC


 Pain Scale (3-5)  8/18/18 03:15


 9/17/18 03:14   


 


 


 Lisinopril


  (Zestril)  10 mg  DAILY


 ORAL


   8/18/18 09:00


 9/17/18 08:59  8/19/18 09:16


 


 


 Metformin HCl


  (Glucophage)  500 mg  BID


 ORAL


   8/18/18 09:00


 9/17/18 08:59  8/19/18 09:15


 


 


 Morphine Sulfate


  (Morphine


 Sulfate)  2 mg  Q4H  PRN


 IVP


 For Pain  8/18/18 18:00


 8/25/18 17:59   


 


 


 Nitroglycerin


  (Nitro-Bid)  1 inch  TID@0600,1200,1800


 TOPIC


   8/18/18 19:00


 9/17/18 18:59  8/19/18 12:12


 


 


 Ondansetron HCl


  (Zofran)  4 mg  Q4HR  PRN


 IVP


 Nausea & Vomiting  8/18/18 03:15


 9/17/18 03:14   


 


 


 Regadenoson


  (Lexiscan)  0.4 mg  ONCE  PRN


 IV


 STRESS TEST  8/18/18 18:15


 8/20/18 23:59   


 











Assessment/Plan


Status:  stable


Assessment/Plan


Assessment


(1) Hypercholesteremia


(2) Chest pain


(3) CAD (coronary artery disease)


(4) DM (diabetes mellitus)


(5) HTN (hypertension)


(6) Asthma





Plan:


Echo reviewed, preserved LV function diastolic dysfunction, no wall motion 

abnormalities


Aspirin


Nitro SL prn


Statin


Serial EKG/Troponin


Cardiolite in AM


Continue blood pressure medications


hold BB for stress test


GI cocktail


Outpatient endoscopy


Evaluate for H pylori











Mikey Okeefe M.D. Aug 19, 2018 16:24

## 2018-08-19 NOTE — INTERNAL MED PROGRESS NOTE
Subjective


Date of Service:  Aug 19, 2018


Physician Name


Lui Bustillo


Attending Physician


Jesus Sepulveda MD





Current Medications








 Medications


  (Trade)  Dose


 Ordered  Sig/Mirian


 Route


 PRN Reason  Start Time


 Stop Time Status Last Admin


Dose Admin


 


 Acetaminophen


  (Tylenol)  650 mg  Q6H  PRN


 ORAL


 Mild Pain/Temp > 100.5  8/18/18 03:15


 9/17/18 03:14   


 


 


 Acetaminophen/


 Hydrocodone Bitart


  (Norco 10/325)  1 tab  Q6HR  PRN


 ORAL


 For Pain  8/18/18 03:15


 8/25/18 03:14  8/18/18 06:42


 


 


 Albuterol/


 Ipratropium


  (Albuterol/


 Ipratropium)  3 ml  Q4H  PRN


 HHN


 Shortness of Breath  8/18/18 10:45


 8/23/18 10:44   


 


 


 Amlodipine


 Besylate


  (Norvasc)  10 mg  DAILY


 ORAL


   8/18/18 09:00


 9/17/18 08:59  8/19/18 09:16


 


 


 Aspirin


  (ASA)  81 mg  DAILY


 ORAL


   8/18/18 09:00


 9/17/18 08:59  8/19/18 09:16


 


 


 Atorvastatin


 Calcium


  (Lipitor)  40 mg  BEDTIME


 ORAL


   8/18/18 21:00


 9/17/18 20:59  8/18/18 20:51


 


 


 Baclofen


  (Lioresal)  10 mg  THREE TIMES A DAY  PRN


 ORAL


 Muscle Spasm  8/18/18 03:15


 9/17/18 03:14  8/19/18 00:46


 


 


 Dextrose


  (Dextrose 50%)  25 ml  STAT  PRN


 IV


 Hypoglycemia  8/18/18 03:15


 9/17/18 03:14   


 


 


 Dextrose


  (Dextrose 50%)  50 ml  STAT  PRN


 IV


 Hypoglycemia  8/18/18 03:15


 9/17/18 03:14   


 


 


 Diphenhydramine


 HCl


  (Benadryl)  25 mg  BID  PRN


 ORAL


 Itching  8/18/18 03:15


 9/17/18 03:14   


 


 


 Famotidine


  (Pepcid)  20 mg  DAILY


 ORAL


   8/18/18 09:00


 9/17/18 08:59  8/19/18 09:16


 


 


 Gabapentin


  (Neurontin)  800 mg  THREE TIMES A  DAY


 ORAL


   8/18/18 09:00


 9/17/18 08:59  8/19/18 12:12


 


 


 Glipizide


  (Glucotrol)  5 mg  BIAC


 ORAL


   8/18/18 06:30


 9/17/18 06:29  8/19/18 06:16


 


 


 Heparin Sodium


  (Porcine)


  (Heparin 5000


 units/ml)  5,000 units  EVERY 8  HOURS


 SUBQ


   8/18/18 06:00


 9/17/18 05:59  8/19/18 06:20


 


 


 Insulin Aspart


  (NovoLOG)    BEFORE MEALS AND  HS


 SUBQ


   8/18/18 06:30


 9/17/18 06:29  8/19/18 12:17


 


 


 Lidocaine


  (Xylocaine 5%


 cream)  1 applic  PRN  PRN


 TOPIC


 Pain Scale (3-5)  8/18/18 03:15


 9/17/18 03:14   


 


 


 Lisinopril


  (Zestril)  10 mg  DAILY


 ORAL


   8/18/18 09:00


 9/17/18 08:59  8/19/18 09:16


 


 


 Metformin HCl


  (Glucophage)  500 mg  BID


 ORAL


   8/18/18 09:00


 9/17/18 08:59  8/19/18 09:15


 


 


 Morphine Sulfate


  (Morphine


 Sulfate)  2 mg  Q4H  PRN


 IVP


 For Pain  8/18/18 18:00


 8/25/18 17:59   


 


 


 Nitroglycerin


  (Nitro-Bid)  1 inch  TID@0600,1200,1800


 TOPIC


   8/18/18 19:00


 9/17/18 18:59  8/19/18 12:12


 


 


 Ondansetron HCl


  (Zofran)  4 mg  Q4HR  PRN


 IVP


 Nausea & Vomiting  8/18/18 03:15


 9/17/18 03:14   


 


 


 Regadenoson


  (Lexiscan)  0.4 mg  ONCE  PRN


 IV


 STRESS TEST  8/18/18 18:15


 8/20/18 23:59   


 








Allergies:  


Coded Allergies:  


     No Known Allergies (Unverified , 8/21/15)


ROS Limited/Unobtainable:  No


Constitutional:  Reports: no symptoms


HEENT:  Reports: no symptoms


Cardiovascular:  Reports: chest pain


Respiratory:  Reports: no symptoms


Gastrointestinal/Abdominal:  Reports: no symptoms


Genitourinary:  Reports: no symptoms


Neurologic/Psychiatric:  Reports: no symptoms


Subjective


63 YO M admitted with chest pain.  Cover for Int Michael-Dr Sepulveda.  Await 

cardiolite stress test





Objective





Last Vital Signs








  Date Time  Temp Pulse Resp B/P (MAP) Pulse Ox O2 Delivery O2 Flow Rate FiO2


 


8/19/18 12:12    126/72    


 


8/19/18 09:16  78      


 


8/19/18 08:31      Room Air  


 


8/19/18 08:25   18     21


 


8/19/18 08:00 97.6    97   





 97.6       








General Appearance:  WD/WN, no apparent distress, alert, obese


EENT:  PERRL/EOMI, normal ENT inspection


Neck:  non-tender, normal alignment, supple, normal inspection


Cardiovascular:  normal peripheral pulses, normal rate, regular rhythm, no 

gallop/murmur, no JVD


Respiratory/Chest:  chest wall non-tender, lungs clear, normal breath sounds, 

no respiratory distress, no accessory muscle use


Abdomen:  normal bowel sounds, non tender, soft, no organomegaly, no mass


Extremities:  normal range of motion, non-tender


Neurologic:  CNs II-XII grossly normal, no motor/sensory deficits


Skin:  normal pigmentation, warm/dry





Laboratory Tests








Test


  8/18/18


17:45 8/19/18


05:00


 


Troponin I


  0.000 ng/mL


(0.000-0.056) 


 


 


White Blood Count


  


  7.9 K/UL


(4.8-10.8)


 


Red Blood Count


  


  3.90 M/UL


(4.70-6.10)  L


 


Hemoglobin


  


  12.4 G/DL


(14.2-18.0)  L


 


Hematocrit


  


  36.6 %


(42.0-52.0)  L


 


Mean Corpuscular Volume  94 FL (80-99)  


 


Mean Corpuscular Hemoglobin


  


  31.9 PG


(27.0-31.0)  H


 


Mean Corpuscular Hemoglobin


Concent 


  34.0 G/DL


(32.0-36.0)


 


Red Cell Distribution Width


  


  11.6 %


(11.6-14.8)


 


Platelet Count


  


  187 K/UL


(150-450)


 


Mean Platelet Volume


  


  11.1 FL


(6.5-10.1)  H


 


Neutrophils (%) (Auto)


  


  63.6 %


(45.0-75.0)


 


Lymphocytes (%) (Auto)


  


  26.3 %


(20.0-45.0)


 


Monocytes (%) (Auto)


  


  4.9 %


(1.0-10.0)


 


Eosinophils (%) (Auto)


  


  4.0 %


(0.0-3.0)  H


 


Basophils (%) (Auto)


  


  1.3 %


(0.0-2.0)


 


Sodium Level


  


  140 MMOL/L


(136-145)


 


Potassium Level


  


  3.9 MMOL/L


(3.5-5.1)


 


Chloride Level


  


  105 MMOL/L


()


 


Carbon Dioxide Level


  


  28 MMOL/L


(21-32)


 


Anion Gap


  


  7 mmol/L


(5-15)


 


Blood Urea Nitrogen


  


  12 mg/dL


(7-18)


 


Creatinine


  


  1.1 MG/DL


(0.55-1.30)


 


Estimat Glomerular Filtration


Rate 


  > 60 mL/min


(>60)


 


Glucose Level


  


  167 MG/DL


()  #H


 


Calcium Level


  


  8.5 MG/DL


(8.5-10.1)


 


Total Bilirubin


  


  0.5 MG/DL


(0.2-1.0)


 


Gamma Glutamyl Transpeptidase


  


  180 U/L (5-85)


H


 


Aspartate Amino Transf


(AST/SGOT) 


  36 U/L (15-37)


 


 


Alanine Aminotransferase


(ALT/SGPT) 


  78 U/L (12-78)


 


 


Alkaline Phosphatase


  


  84 U/L


()


 


Total Protein


  


  7.5 G/DL


(6.4-8.2)


 


Albumin


  


  3.4 G/DL


(3.4-5.0)


 


Globulin  4.1 g/dL  


 


Albumin/Globulin Ratio


  


  0.8 (1.0-2.7)


L


 


Triglycerides Level


  


  236 MG/DL


()  H


 


Cholesterol Level


  


  142 MG/DL (<


200)


 


LDL Cholesterol


  


  75 mg/dL


(<100)


 


HDL Cholesterol


  


  36 MG/DL


(40-60)  L


 


Cholesterol/HDL Ratio  3.9 (3.3-4.4)  


 


Hepatitis A IgM Antibody  Pending  


 


Hepatitis B Surface Antigen  Pending  


 


Hepatitis B Core IgM Antibody  Pending  


 


Hepatitis C Antibody  Pending  

















Intake and Output  


 


 8/18/18 8/19/18





 19:00 07:00


 


Intake Total 520 ml 300 ml


 


Output Total 800 ml 550 ml


 


Balance -280 ml -250 ml


 


  


 


Intake Oral 520 ml 300 ml


 


Output Urine Total 800 ml 550 ml











Assessment/Plan


Problem List:  


(1) Hypercholesteremia


(2) Chest pain


Assessment & Plan:  See cardiology note.  Await cardiolite stress test.





(3) CAD (coronary artery disease)


(4) DM (diabetes mellitus)


Assessment & Plan:  Continue novolog sliding scale.





(5) HTN (hypertension)


Assessment & Plan:  Continue lisinopril and norvasc





(6) Asthma


Status:  not improved











Lui Bustillo MD Aug 19, 2018 13:35

## 2018-08-19 NOTE — PULMONOLOGY PROGRESS NOTE
Assessment/Plan


Assessment/Plan


ASSESSMENT 


chest pain rule out ACS


asthma


elevated D-dimer  


CAD with ? hx of MI  


HTN


hyperlipidemia


diabetes mellitus


transaminitis


ETOH use , possible abuse





PLAN OF CARE


telemetry


serial troponin x2 negative


EKG non specific changes, r/o for acute MI  


ECHO with pEF 55-60%, no WMA


cardio evaluation pending for this am  


stress test in am


aspirin


Nitro topical


BP management with CCB and ACE, consider BB -  per cardio discretion 


Venous duplex BLE prelim negative for acute DVT


O2 prn titrate


pulmonary toilet 


CXR from [facility] stable  


DVT, GI prophylaxis


BS management with oral anti-glycemic and SSI prn, UlP2v-1.0   not at goal


continue statin , check lipid panel in am


trend  LFT down to normal ? to alcohol use, elevated GGT  


hepatitis panel pending 


pain management 


bowel regimen 


supportive care   





case discussed and evaluated by supervising physician





Subjective


Allergies:  


Coded Allergies:  


     No Known Allergies (Unverified , 8/21/15)


Subjective


yesterday afternoon c/o severe chest pain, troponin negative x 2


ECG with nonspecific changes, 


started on Nitro topical and Morphine, pain resolved 


stress test pending along with cardio eval


this am no chest pain, no SOB 


pulse ox stable on RA





Objective





Last 24 Hour Vital Signs








  Date Time  Temp Pulse Resp B/P (MAP) Pulse Ox O2 Delivery O2 Flow Rate FiO2


 


8/19/18 09:16  78  137/63    


 


8/19/18 09:16    137/63    


 


8/19/18 08:31      Room Air  


 


8/19/18 08:25  78 18   Room Air  21


 


8/19/18 08:00 97.6 78 19 137/63 (87) 97   





 97.6       


 


8/19/18 06:17    143/77    


 


8/19/18 04:00 97.6 71 22 143/77 (99) 95   





 97.6       


 


8/19/18 04:00  88      


 


8/19/18 00:00 98.1 87 20 139/75 (96) 95   





 98.1       


 


8/18/18 21:00      Room Air  


 


8/18/18 20:52    136/71    


 


8/18/18 20:00 97.8 72 22 141/75 (97) 97   





 97.8       


 


8/18/18 20:00  73      


 


8/18/18 16:00 96.8 76 21 136/71 (92) 96   





 96.8       


 


8/18/18 15:25  71      


 


8/18/18 12:00 97.2 73 21 116/85 (95) 99   





 97.2       


 


8/18/18 11:56  64      

















Intake and Output  


 


 8/18/18 8/19/18





 19:00 07:00


 


Intake Total 520 ml 300 ml


 


Output Total 800 ml 550 ml


 


Balance -280 ml -250 ml


 


  


 


Intake Oral 520 ml 300 ml


 


Output Urine Total 800 ml 550 ml








Objective


General Appearance:  no apparent distress, alert


Lines, tubes and drains:  peripheral


HEENT:  normocephalic, atraumatic, anicteric, mucous membranes moist


Neck:  non-tender, supple, normal inspection


Respiratory/Chest:  lungs clear - with moderate air exchange , no respiratory 

distress, no accessory muscle use


Cardiovascular/Chest:  normal rate, regular rhythm - SR on tele, distant heart 

sounds


Abdomen:  non tender, soft - obese


Extremities:  normal range of motion, non-tender, no calf tenderness, normal 

capillary refill


Skin Exam:  warm/dry


Neurologic:  no motor/sensory deficits, alert, oriented x 3, responsive


Musculoskeletal:  normal muscle bulk








Laboratory Tests


8/18/18 17:45: Troponin I 0.000


8/19/18 05:00: 


White Blood Count 7.9, Red Blood Count 3.90L, Hemoglobin 12.4L, Hematocrit 36.6L

, Mean Corpuscular Volume 94, Mean Corpuscular Hemoglobin 31.9H, Mean 

Corpuscular Hemoglobin Concent 34.0, Red Cell Distribution Width 11.6, Platelet 

Count 187, Mean Platelet Volume 11.1H, Neutrophils (%) (Auto) 63.6, Lymphocytes 

(%) (Auto) 26.3, Monocytes (%) (Auto) 4.9, Eosinophils (%) (Auto) 4.0H, 

Basophils (%) (Auto) 1.3, Sodium Level 140, Potassium Level 3.9, Chloride Level 

105, Carbon Dioxide Level 28, Anion Gap 7, Blood Urea Nitrogen 12, Creatinine 

1.1, Estimat Glomerular Filtration Rate > 60, Glucose Level 167#H, Calcium 

Level 8.5, Total Bilirubin 0.5, Gamma Glutamyl Transpeptidase 180H, Aspartate 

Amino Transf (AST/SGOT) 36, Alanine Aminotransferase (ALT/SGPT) 78, Alkaline 

Phosphatase 84, Total Protein 7.5, Albumin 3.4, Globulin 4.1, Albumin/Globulin 

Ratio 0.8L, Hepatitis A IgM Antibody [Pending], Hepatitis B Surface Antigen [

Pending], Hepatitis B Core IgM Antibody [Pending], Hepatitis C Antibody [Pending

]





Current Medications








 Medications


  (Trade)  Dose


 Ordered  Sig/Mirian


 Route


 PRN Reason  Start Time


 Stop Time Status Last Admin


Dose Admin


 


 Acetaminophen


  (Tylenol)  650 mg  Q6H  PRN


 ORAL


 Mild Pain/Temp > 100.5  8/18/18 03:15


 9/17/18 03:14   


 


 


 Acetaminophen/


 Hydrocodone Bitart


  (Norco 10/325)  1 tab  Q6HR  PRN


 ORAL


 For Pain  8/18/18 03:15


 8/25/18 03:14  8/18/18 06:42


 


 


 Albuterol/


 Ipratropium


  (Albuterol/


 Ipratropium)  3 ml  Q4H  PRN


 HHN


 Shortness of Breath  8/18/18 10:45


 8/23/18 10:44   


 


 


 Amlodipine


 Besylate


  (Norvasc)  10 mg  DAILY


 ORAL


   8/18/18 09:00


 9/17/18 08:59  8/19/18 09:16


 


 


 Aspirin


  (ASA)  81 mg  DAILY


 ORAL


   8/18/18 09:00


 9/17/18 08:59  8/19/18 09:16


 


 


 Atorvastatin


 Calcium


  (Lipitor)  40 mg  BEDTIME


 ORAL


   8/18/18 21:00


 9/17/18 20:59  8/18/18 20:51


 


 


 Baclofen


  (Lioresal)  10 mg  THREE TIMES A DAY  PRN


 ORAL


 Muscle Spasm  8/18/18 03:15


 9/17/18 03:14  8/19/18 00:46


 


 


 Dextrose


  (Dextrose 50%)  25 ml  STAT  PRN


 IV


 Hypoglycemia  8/18/18 03:15


 9/17/18 03:14   


 


 


 Dextrose


  (Dextrose 50%)  50 ml  STAT  PRN


 IV


 Hypoglycemia  8/18/18 03:15


 9/17/18 03:14   


 


 


 Diphenhydramine


 HCl


  (Benadryl)  25 mg  BID  PRN


 ORAL


 Itching  8/18/18 03:15


 9/17/18 03:14   


 


 


 Famotidine


  (Pepcid)  20 mg  DAILY


 ORAL


   8/18/18 09:00


 9/17/18 08:59  8/19/18 09:16


 


 


 Gabapentin


  (Neurontin)  800 mg  THREE TIMES A  DAY


 ORAL


   8/18/18 09:00


 9/17/18 08:59  8/19/18 09:16


 


 


 Glipizide


  (Glucotrol)  5 mg  BIAC


 ORAL


   8/18/18 06:30


 9/17/18 06:29  8/19/18 06:16


 


 


 Heparin Sodium


  (Porcine)


  (Heparin 5000


 units/ml)  5,000 units  EVERY 8  HOURS


 SUBQ


   8/18/18 06:00


 9/17/18 05:59  8/19/18 06:20


 


 


 Insulin Aspart


  (NovoLOG)    BEFORE MEALS AND  HS


 SUBQ


   8/18/18 06:30


 9/17/18 06:29  8/19/18 06:18


 


 


 Lidocaine


  (Xylocaine 5%


 cream)  1 applic  PRN  PRN


 TOPIC


 Pain Scale (3-5)  8/18/18 03:15


 9/17/18 03:14   


 


 


 Lisinopril


  (Zestril)  10 mg  DAILY


 ORAL


   8/18/18 09:00


 9/17/18 08:59  8/19/18 09:16


 


 


 Metformin HCl


  (Glucophage)  500 mg  BID


 ORAL


   8/18/18 09:00


 9/17/18 08:59  8/19/18 09:15


 


 


 Morphine Sulfate


  (Morphine


 Sulfate)  2 mg  Q4H  PRN


 IVP


 For Pain  8/18/18 18:00


 8/25/18 17:59   


 


 


 Nitroglycerin


  (Nitro-Bid)  1 inch  TID@0600,1200,1800


 TOPIC


   8/18/18 19:00


 9/17/18 18:59  8/19/18 06:17


 


 


 Ondansetron HCl


  (Zofran)  4 mg  Q4HR  PRN


 IVP


 Nausea & Vomiting  8/18/18 03:15


 9/17/18 03:14   


 


 


 Regadenoson


  (Lexiscan)  0.4 mg  ONCE  PRN


 IV


 STRESS TEST  8/18/18 18:15


 8/20/18 23:59   


 

















Charleen Chery NP Aug 19, 2018 09:39

## 2018-08-20 VITALS — SYSTOLIC BLOOD PRESSURE: 128 MMHG | DIASTOLIC BLOOD PRESSURE: 79 MMHG

## 2018-08-20 VITALS — DIASTOLIC BLOOD PRESSURE: 81 MMHG | SYSTOLIC BLOOD PRESSURE: 146 MMHG

## 2018-08-20 VITALS — SYSTOLIC BLOOD PRESSURE: 104 MMHG | DIASTOLIC BLOOD PRESSURE: 60 MMHG

## 2018-08-20 VITALS — SYSTOLIC BLOOD PRESSURE: 129 MMHG | DIASTOLIC BLOOD PRESSURE: 76 MMHG

## 2018-08-20 VITALS — DIASTOLIC BLOOD PRESSURE: 88 MMHG | SYSTOLIC BLOOD PRESSURE: 152 MMHG

## 2018-08-20 VITALS — SYSTOLIC BLOOD PRESSURE: 150 MMHG | DIASTOLIC BLOOD PRESSURE: 73 MMHG

## 2018-08-20 VITALS — DIASTOLIC BLOOD PRESSURE: 82 MMHG | SYSTOLIC BLOOD PRESSURE: 132 MMHG

## 2018-08-20 LAB
ADD MANUAL DIFF: NO
ANION GAP SERPL CALC-SCNC: 6 MMOL/L (ref 5–15)
BASOPHILS NFR BLD AUTO: 1 % (ref 0–2)
BUN SERPL-MCNC: 12 MG/DL (ref 7–18)
CALCIUM SERPL-MCNC: 8.7 MG/DL (ref 8.5–10.1)
CHLORIDE SERPL-SCNC: 105 MMOL/L (ref 98–107)
CO2 SERPL-SCNC: 30 MMOL/L (ref 21–32)
CREAT SERPL-MCNC: 1.1 MG/DL (ref 0.55–1.3)
EOSINOPHIL NFR BLD AUTO: 3.6 % (ref 0–3)
ERYTHROCYTE [DISTWIDTH] IN BLOOD BY AUTOMATED COUNT: 11.2 % (ref 11.6–14.8)
HCT VFR BLD CALC: 34.9 % (ref 42–52)
HGB BLD-MCNC: 12.2 G/DL (ref 14.2–18)
LYMPHOCYTES NFR BLD AUTO: 31.9 % (ref 20–45)
MCV RBC AUTO: 93 FL (ref 80–99)
MONOCYTES NFR BLD AUTO: 8 % (ref 1–10)
NEUTROPHILS NFR BLD AUTO: 55.6 % (ref 45–75)
PLATELET # BLD: 177 K/UL (ref 150–450)
POTASSIUM SERPL-SCNC: 3.8 MMOL/L (ref 3.5–5.1)
RBC # BLD AUTO: 3.75 M/UL (ref 4.7–6.1)
SODIUM SERPL-SCNC: 141 MMOL/L (ref 136–145)
WBC # BLD AUTO: 5.6 K/UL (ref 4.8–10.8)

## 2018-08-20 RX ADMIN — LISINOPRIL SCH MG: 10 TABLET ORAL at 08:13

## 2018-08-20 RX ADMIN — GLIPIZIDE SCH MG: 5 TABLET ORAL at 06:30

## 2018-08-20 RX ADMIN — HEPARIN SODIUM SCH UNITS: 5000 INJECTION INTRAVENOUS; SUBCUTANEOUS at 21:11

## 2018-08-20 RX ADMIN — NITROGLYCERIN SCH INCH: 20 OINTMENT TOPICAL at 06:46

## 2018-08-20 RX ADMIN — INSULIN ASPART SCH UNITS: 100 INJECTION, SOLUTION INTRAVENOUS; SUBCUTANEOUS at 11:30

## 2018-08-20 RX ADMIN — METFORMIN HYDROCHLORIDE SCH MG: 500 TABLET ORAL at 17:27

## 2018-08-20 RX ADMIN — ASPIRIN 81 MG SCH MG: 81 TABLET ORAL at 08:13

## 2018-08-20 RX ADMIN — NITROGLYCERIN SCH INCH: 20 OINTMENT TOPICAL at 17:28

## 2018-08-20 RX ADMIN — HEPARIN SODIUM SCH UNITS: 5000 INJECTION INTRAVENOUS; SUBCUTANEOUS at 06:05

## 2018-08-20 RX ADMIN — INSULIN ASPART SCH UNITS: 100 INJECTION, SOLUTION INTRAVENOUS; SUBCUTANEOUS at 21:10

## 2018-08-20 RX ADMIN — INSULIN ASPART SCH UNITS: 100 INJECTION, SOLUTION INTRAVENOUS; SUBCUTANEOUS at 06:30

## 2018-08-20 RX ADMIN — HEPARIN SODIUM SCH UNITS: 5000 INJECTION INTRAVENOUS; SUBCUTANEOUS at 14:00

## 2018-08-20 RX ADMIN — GLIPIZIDE SCH MG: 5 TABLET ORAL at 17:28

## 2018-08-20 RX ADMIN — INSULIN ASPART SCH UNITS: 100 INJECTION, SOLUTION INTRAVENOUS; SUBCUTANEOUS at 17:29

## 2018-08-20 RX ADMIN — NITROGLYCERIN SCH INCH: 20 OINTMENT TOPICAL at 12:25

## 2018-08-20 RX ADMIN — MORPHINE SULFATE PRN MG: 2 INJECTION, SOLUTION INTRAMUSCULAR; INTRAVENOUS at 08:15

## 2018-08-20 RX ADMIN — METFORMIN HYDROCHLORIDE SCH MG: 500 TABLET ORAL at 08:13

## 2018-08-20 NOTE — PULMONOLOGY PROGRESS NOTE
Assessment/Plan


Problems:  


(1) ACS (acute coronary syndrome)


(2) Costochondritis


(3) LAURA (obstructive sleep apnea)


(4) Asthma


(5) CAD (coronary artery disease)


(6) HTN (hypertension)


(7) DM (diabetes mellitus)


Assessment/Plan


symptomatic treatment


stress test


echo reviewed


respiratory treatment


titrate fio2 to sat of 92%





Subjective


ROS Limited/Unobtainable:  No


Constitutional:  Reports: no symptoms


HEENT:  Repors: no symptoms


Respiratory:  Reports: no symptoms


Allergies:  


Coded Allergies:  


     No Known Allergies (Unverified , 8/21/15)





Objective





Last 24 Hour Vital Signs








  Date Time  Temp Pulse Resp B/P (MAP) Pulse Ox O2 Delivery O2 Flow Rate FiO2


 


8/20/18 09:00      Room Air  


 


8/20/18 08:25  82 18   Room Air  21


 


8/20/18 08:13  67  147/82    


 


8/20/18 08:13    147/82    


 


8/20/18 08:00 97.5 67 20 146/81 (102) 97   





 97.5       


 


8/20/18 08:00  77      


 


8/20/18 06:52    152/88 (109)    


 


8/20/18 06:46    152/88    


 


8/20/18 04:00  72      


 


8/20/18 04:00 97.4 75 20 104/60 (75) 94   





 97.4       


 


8/20/18 00:00 97.4 73 20 150/73 (98) 96   





 97.4       


 


8/20/18 00:00  75      


 


8/19/18 22:30  79 18   Room Air  21


 


8/19/18 21:00 97.9 85 20 152/74 (100) 94   





 97.9       


 


8/19/18 21:00      Room Air  


 


8/19/18 20:00  86      


 


8/19/18 18:20    148/73    


 


8/19/18 16:00 97.7 85 22 148/73 (98) 97   





 97.7       


 


8/19/18 15:49  75      


 


8/19/18 12:12    126/72    


 


8/19/18 12:00 97.7 84 20 126/72 (90) 99   





 97.7       


 


8/19/18 11:55  84      

















Intake and Output  


 


 8/19/18 8/20/18





 19:00 07:00


 


Intake Total 520 ml 


 


Output Total 600 ml 1000 ml


 


Balance -80 ml -1000 ml


 


  


 


Intake Oral 520 ml 


 


Output Urine Total 600 ml 1000 ml


 


# Voids  3








General Appearance:  WD/WN


HEENT:  normocephalic, atraumatic


Respiratory/Chest:  chest wall non-tender, lungs clear, normal breath sounds


Cardiovascular:  normal peripheral pulses, normal rate


Abdomen:  normal bowel sounds, soft, non tender


Extremities:  no clubbing


Neurologic/Psychiatric:  CNs II-XII grossly normal


Laboratory Tests


8/20/18 06:30: 


White Blood Count 5.6, Red Blood Count 3.75L, Hemoglobin 12.2L, Hematocrit 34.9L

, Mean Corpuscular Volume 93, Mean Corpuscular Hemoglobin 32.4H, Mean 

Corpuscular Hemoglobin Concent 34.8, Red Cell Distribution Width 11.2L, 

Platelet Count 177, Mean Platelet Volume 11.1H, Neutrophils (%) (Auto) 55.6, 

Lymphocytes (%) (Auto) 31.9, Monocytes (%) (Auto) 8.0, Eosinophils (%) (Auto) 

3.6H, Basophils (%) (Auto) 1.0, Sodium Level 141, Potassium Level 3.8, Chloride 

Level 105, Carbon Dioxide Level 30, Anion Gap 6, Blood Urea Nitrogen 12, 

Creatinine 1.1, Estimat Glomerular Filtration Rate > 60, Glucose Level 176H, 

Calcium Level 8.7





Current Medications








 Medications


  (Trade)  Dose


 Ordered  Sig/Mirian


 Route


 PRN Reason  Start Time


 Stop Time Status Last Admin


Dose Admin


 


 Acetaminophen


  (Tylenol)  650 mg  Q6H  PRN


 ORAL


 Mild Pain/Temp > 100.5  8/18/18 03:15


 9/17/18 03:14   


 


 


 Acetaminophen/


 Hydrocodone Bitart


  (Norco 10/325)  1 tab  Q6HR  PRN


 ORAL


 For Pain  8/18/18 03:15


 8/25/18 03:14  8/18/18 06:42


 


 


 Albuterol/


 Ipratropium


  (Albuterol/


 Ipratropium)  3 ml  Q4H  PRN


 HHN


 Shortness of Breath  8/18/18 10:45


 8/23/18 10:44   


 


 


 Amlodipine


 Besylate


  (Norvasc)  10 mg  DAILY


 ORAL


   8/18/18 09:00


 9/17/18 08:59  8/20/18 08:13


 


 


 Aspirin


  (ASA)  81 mg  DAILY


 ORAL


   8/18/18 09:00


 9/17/18 08:59  8/20/18 08:13


 


 


 Atorvastatin


 Calcium


  (Lipitor)  40 mg  BEDTIME


 ORAL


   8/18/18 21:00


 9/17/18 20:59  8/19/18 22:03


 


 


 Baclofen


  (Lioresal)  10 mg  THREE TIMES A DAY  PRN


 ORAL


 Muscle Spasm  8/18/18 03:15


 9/17/18 03:14  8/19/18 14:10


 


 


 Dextrose


  (Dextrose 50%)  25 ml  STAT  PRN


 IV


 Hypoglycemia  8/18/18 03:15


 9/17/18 03:14   


 


 


 Dextrose


  (Dextrose 50%)  50 ml  STAT  PRN


 IV


 Hypoglycemia  8/18/18 03:15


 9/17/18 03:14   


 


 


 Diphenhydramine


 HCl


  (Benadryl)  25 mg  BID  PRN


 ORAL


 Itching  8/18/18 03:15


 9/17/18 03:14   


 


 


 Famotidine


  (Pepcid)  20 mg  DAILY


 ORAL


   8/18/18 09:00


 9/17/18 08:59  8/20/18 08:13


 


 


 Gabapentin


  (Neurontin)  800 mg  THREE TIMES A  DAY


 ORAL


   8/18/18 09:00


 9/17/18 08:59  8/20/18 08:14


 


 


 Glipizide


  (Glucotrol)  5 mg  BIAC


 ORAL


   8/18/18 06:30


 9/17/18 06:29  8/19/18 18:19


 


 


 Heparin Sodium


  (Porcine)


  (Heparin 5000


 units/ml)  5,000 units  EVERY 8  HOURS


 SUBQ


   8/18/18 06:00


 9/17/18 05:59  8/20/18 06:05


 


 


 Insulin Aspart


  (NovoLOG)    BEFORE MEALS AND  HS


 SUBQ


   8/18/18 06:30


 9/17/18 06:29  8/19/18 22:05


 


 


 Lidocaine


  (Xylocaine 5%


 cream)  1 applic  PRN  PRN


 TOPIC


 Pain Scale (3-5)  8/18/18 03:15


 9/17/18 03:14   


 


 


 Lisinopril


  (Zestril)  10 mg  DAILY


 ORAL


   8/18/18 09:00


 9/17/18 08:59  8/20/18 08:13


 


 


 Metformin HCl


  (Glucophage)  500 mg  BID


 ORAL


   8/18/18 09:00


 9/17/18 08:59  8/20/18 08:13


 


 


 Morphine Sulfate


  (Morphine


 Sulfate)  2 mg  Q4H  PRN


 IVP


 For Pain  8/18/18 18:00


 8/25/18 17:59  8/20/18 08:15


 


 


 Nitroglycerin


  (Nitro-Bid)  1 inch  TID@0600,1200,1800


 TOPIC


   8/18/18 19:00


 9/17/18 18:59  8/20/18 06:46


 


 


 Ondansetron HCl


  (Zofran)  4 mg  Q4HR  PRN


 IVP


 Nausea & Vomiting  8/18/18 03:15


 9/17/18 03:14   


 


 


 Regadenoson


  (Lexiscan)  0.4 mg  ONCE  PRN


 IV


 STRESS TEST  8/18/18 18:15


 8/20/18 23:59   


 

















Ronaldo Bolanos MD Aug 20, 2018 10:32

## 2018-08-20 NOTE — CONSULTATION
History of Present Illness


General


Date patient seen:  Aug 20, 2018


Referring physician:  dr Sepulveda


Reason for Consultation:  SOB/pulm consult





Present Illness


HPI


62-year-old  male with history of coronary artery disease, who 

presents with chief complaint of chest pain. The pt has low energy and anxiety


Allergies:  


Coded Allergies:  


     No Known Allergies (Unverified , 8/21/15)





Medication History


Scheduled


Albuterol Sulfate (Ventolin Hfa), 1 PUFF INH EVERY 6 HOURS, (Reported)


Albuterol Sulfate* (Albuterol Sulfate Hhn*), 2.5 MG HHN Q6HRT


Amlodipine Besylate (Norvasc), 10 MG ORAL DAILY, (Reported)


Aspirin* (Aspir-Low*), 81 MG ORAL DAILY, (Reported)


Atorvastatin (Lipitor), 40 MG ORAL BEDTIME, (Reported)


Baclofen* (Baclofen*), 10 MG ORAL THREE TIMES A DAY, (Reported)


Gabapentin* (Gabapentin*), 800 MG ORAL THREE TIMES A DAY, (Reported)


Glipizide* (Glipizide*), 5 MG ORAL BIDAC, (Reported)


Lidocaine HCL 2% Jelly* (Lidocaine Jelly 2%*), 5 ML TOPIC DAILY, (Reported)


Lisinopril (Lisinopril*), 10 MG ORAL DAILY, (Reported)


Metformin Hcl* (Metformin Hcl*), 500 MG ORAL TWICE A DAY, (Reported)





Scheduled PRN


Acetaminophen* (Tylenol Extra Strength*), 500 MG ORAL Q6H PRN for Mild Pain/

Temp > 100.5, (Reported)


Diphenhydramine HCl (Benadryl), 25 MG PO BID PRN for Itching, (Reported)


Hydrocodone Bit/Acetaminophen * (Norco *), 1 TAB ORAL Q6H PRN for 

For Pain, (Reported)





Miscellaneous Medications


Famotidine (Pepcid Ac), 20 MG PO, (Reported)





Discontinued Medications


Amlodipine Besylate (Norvasc), 5 MG ORAL DAILY


   Discontinued Reason: Medication dose changed


Atorvastatin Calcium* (Lipitor*), 80 MG ORAL BEDTIME, (Reported)


   Discontinued Reason: Medication dose changed


Clopidogrel Bisulfate* (Plavix*), 75 MG ORAL DAILY


   Discontinued Reason: Pt stopped taking med


Gabapentin* (Gabapentin*), 300 MG ORAL THREE TIMES A DAY, (Reported)


   Discontinued Reason: Medication dose changed


Hydrocodone Bit/Acetaminophen 5-325* (Norco 5-325*), 1 TAB ORAL Q6H PRN for For 

Pain


   Discontinued Reason: Medication dose changed


Levetiracetam (Keppra), 500 MG ORAL EVERY 12 HOURS


   Discontinued Reason: Pt stopped taking med


Lisinopril (Lisinopril*), 20 MG ORAL DAILY, (Reported)


   Discontinued Reason: Medication dose changed


Metformin Hcl* (Metformin Hcl*), 1,000 MG ORAL DAILY, (Reported)


   Discontinued Reason: Medication dose changed


Metoprolol Tartrate* (Metoprolol Tartrate*), 12.5 MG ORAL BID, (Reported)


   Discontinued Reason: Pt stopped taking med


Omeprazole (Omeprazole), 20 MG ORAL DAILY, (Reported)


   Discontinued Reason: Pt stopped taking med


Simethicone* (Simethicone*), 80 MG ORAL Q8H PRN for Gas, (Reported)


   Discontinued Reason: Pt stopped taking med





Patient History


History Provided By:  Patient, Medical Record, PMD


Healthcare decision maker





Resuscitation status


Full Code


Advanced Directive on File


No





Review of Systems


Psychiatric:  Reports: anxiety, depressed feelings





Physical Exam


General Appearance:  no apparent distress, alert


Neurologic:  oriented x 3, responsive, depressed affect





Last 24 Hour Vital Signs








  Date Time  Temp Pulse Resp B/P (MAP) Pulse Ox O2 Delivery O2 Flow Rate FiO2


 


8/20/18 09:00      Room Air  


 


8/20/18 08:25  82 18   Room Air  21


 


8/20/18 08:13  67  147/82    


 


8/20/18 08:13    147/82    


 


8/20/18 08:00 97.5 67 20 146/81 (102) 97   





 97.5       


 


8/20/18 08:00  77      


 


8/20/18 06:52    152/88 (109)    


 


8/20/18 06:46    152/88    


 


8/20/18 04:00  72      


 


8/20/18 04:00 97.4 75 20 104/60 (75) 94   





 97.4       


 


8/20/18 00:00 97.4 73 20 150/73 (98) 96   





 97.4       


 


8/20/18 00:00  75      


 


8/19/18 22:30  79 18   Room Air  21


 


8/19/18 21:00 97.9 85 20 152/74 (100) 94   





 97.9       


 


8/19/18 21:00      Room Air  


 


8/19/18 20:00  86      


 


8/19/18 18:20    148/73    


 


8/19/18 16:00 97.7 85 22 148/73 (98) 97   





 97.7       


 


8/19/18 15:49  75      


 


8/19/18 12:12    126/72    


 


8/19/18 12:00 97.7 84 20 126/72 (90) 99   





 97.7       


 


8/19/18 11:55  84      

















Intake and Output  


 


 8/19/18 8/20/18





 19:00 07:00


 


Intake Total 520 ml 


 


Output Total 600 ml 1000 ml


 


Balance -80 ml -1000 ml


 


  


 


Intake Oral 520 ml 


 


Output Urine Total 600 ml 1000 ml


 


# Voids  3











Laboratory Tests








Test


  8/20/18


06:30


 


White Blood Count


  5.6 K/UL


(4.8-10.8)


 


Red Blood Count


  3.75 M/UL


(4.70-6.10)  L


 


Hemoglobin


  12.2 G/DL


(14.2-18.0)  L


 


Hematocrit


  34.9 %


(42.0-52.0)  L


 


Mean Corpuscular Volume 93 FL (80-99)  


 


Mean Corpuscular Hemoglobin


  32.4 PG


(27.0-31.0)  H


 


Mean Corpuscular Hemoglobin


Concent 34.8 G/DL


(32.0-36.0)


 


Red Cell Distribution Width


  11.2 %


(11.6-14.8)  L


 


Platelet Count


  177 K/UL


(150-450)


 


Mean Platelet Volume


  11.1 FL


(6.5-10.1)  H


 


Neutrophils (%) (Auto)


  55.6 %


(45.0-75.0)


 


Lymphocytes (%) (Auto)


  31.9 %


(20.0-45.0)


 


Monocytes (%) (Auto)


  8.0 %


(1.0-10.0)


 


Eosinophils (%) (Auto)


  3.6 %


(0.0-3.0)  H


 


Basophils (%) (Auto)


  1.0 %


(0.0-2.0)


 


Sodium Level


  141 MMOL/L


(136-145)


 


Potassium Level


  3.8 MMOL/L


(3.5-5.1)


 


Chloride Level


  105 MMOL/L


()


 


Carbon Dioxide Level


  30 MMOL/L


(21-32)


 


Anion Gap


  6 mmol/L


(5-15)


 


Blood Urea Nitrogen


  12 mg/dL


(7-18)


 


Creatinine


  1.1 MG/DL


(0.55-1.30)


 


Estimat Glomerular Filtration


Rate > 60 mL/min


(>60)


 


Glucose Level


  176 MG/DL


()  H


 


Calcium Level


  8.7 MG/DL


(8.5-10.1)








Height (Feet):  5


Height (Inches):  10.00


Weight (Pounds):  260


Medications





Current Medications








 Medications


  (Trade)  Dose


 Ordered  Sig/Mirian


 Route


 PRN Reason  Start Time


 Stop Time Status Last Admin


Dose Admin


 


 Acetaminophen


  (Tylenol)  650 mg  Q6H  PRN


 ORAL


 Mild Pain/Temp > 100.5  8/18/18 03:15


 9/17/18 03:14   


 


 


 Acetaminophen/


 Hydrocodone Bitart


  (Norco 10/325)  1 tab  Q6HR  PRN


 ORAL


 For Pain  8/18/18 03:15


 8/25/18 03:14  8/18/18 06:42


 


 


 Albuterol/


 Ipratropium


  (Albuterol/


 Ipratropium)  3 ml  Q4H  PRN


 HHN


 Shortness of Breath  8/18/18 10:45


 8/23/18 10:44   


 


 


 Amlodipine


 Besylate


  (Norvasc)  10 mg  DAILY


 ORAL


   8/18/18 09:00


 9/17/18 08:59  8/20/18 08:13


 


 


 Aspirin


  (ASA)  81 mg  DAILY


 ORAL


   8/18/18 09:00


 9/17/18 08:59  8/20/18 08:13


 


 


 Atorvastatin


 Calcium


  (Lipitor)  40 mg  BEDTIME


 ORAL


   8/18/18 21:00


 9/17/18 20:59  8/19/18 22:03


 


 


 Baclofen


  (Lioresal)  10 mg  THREE TIMES A DAY  PRN


 ORAL


 Muscle Spasm  8/18/18 03:15


 9/17/18 03:14  8/19/18 14:10


 


 


 Dextrose


  (Dextrose 50%)  25 ml  STAT  PRN


 IV


 Hypoglycemia  8/18/18 03:15


 9/17/18 03:14   


 


 


 Dextrose


  (Dextrose 50%)  50 ml  STAT  PRN


 IV


 Hypoglycemia  8/18/18 03:15


 9/17/18 03:14   


 


 


 Diphenhydramine


 HCl


  (Benadryl)  25 mg  BID  PRN


 ORAL


 Itching  8/18/18 03:15


 9/17/18 03:14   


 


 


 Famotidine


  (Pepcid)  20 mg  DAILY


 ORAL


   8/18/18 09:00


 9/17/18 08:59  8/20/18 08:13


 


 


 Gabapentin


  (Neurontin)  800 mg  THREE TIMES A  DAY


 ORAL


   8/18/18 09:00


 9/17/18 08:59  8/20/18 08:14


 


 


 Glipizide


  (Glucotrol)  5 mg  BIAC


 ORAL


   8/18/18 06:30


 9/17/18 06:29  8/19/18 18:19


 


 


 Heparin Sodium


  (Porcine)


  (Heparin 5000


 units/ml)  5,000 units  EVERY 8  HOURS


 SUBQ


   8/18/18 06:00


 9/17/18 05:59  8/20/18 06:05


 


 


 Insulin Aspart


  (NovoLOG)    BEFORE MEALS AND  HS


 SUBQ


   8/18/18 06:30


 9/17/18 06:29  8/19/18 22:05


 


 


 Lidocaine


  (Xylocaine 5%


 cream)  1 applic  PRN  PRN


 TOPIC


 Pain Scale (3-5)  8/18/18 03:15


 9/17/18 03:14   


 


 


 Lisinopril


  (Zestril)  10 mg  DAILY


 ORAL


   8/18/18 09:00


 9/17/18 08:59  8/20/18 08:13


 


 


 Metformin HCl


  (Glucophage)  500 mg  BID


 ORAL


   8/18/18 09:00


 9/17/18 08:59  8/20/18 08:13


 


 


 Morphine Sulfate


  (Morphine


 Sulfate)  2 mg  Q4H  PRN


 IVP


 For Pain  8/18/18 18:00


 8/25/18 17:59  8/20/18 08:15


 


 


 Nitroglycerin


  (Nitro-Bid)  1 inch  TID@0600,1200,1800


 TOPIC


   8/18/18 19:00


 9/17/18 18:59  8/20/18 06:46


 


 


 Ondansetron HCl


  (Zofran)  4 mg  Q4HR  PRN


 IVP


 Nausea & Vomiting  8/18/18 03:15


 9/17/18 03:14   


 


 


 Regadenoson


  (Lexiscan)  0.4 mg  ONCE  PRN


 IV


 STRESS TEST  8/18/18 18:15


 8/20/18 23:59   


 











Assessment/Plan


Assessment/Plan


anxiety d/o





-ativan prn 


-provided jorge/Josseline Conklin MD Aug 20, 2018 11:35

## 2018-08-20 NOTE — INTERNAL MED PROGRESS NOTE
Subjective


Date of Service:  Aug 20, 2018


Physician Name


Lui Bustillo


Attending Physician


Jesus Sepulvdea MD





Current Medications








 Medications


  (Trade)  Dose


 Ordered  Sig/Mirian


 Route


 PRN Reason  Start Time


 Stop Time Status Last Admin


Dose Admin


 


 Acetaminophen


  (Tylenol)  650 mg  Q6H  PRN


 ORAL


 Mild Pain/Temp > 100.5  8/18/18 03:15


 9/17/18 03:14   


 


 


 Acetaminophen/


 Hydrocodone Bitart


  (Norco 10/325)  1 tab  Q6HR  PRN


 ORAL


 For Pain  8/18/18 03:15


 8/25/18 03:14  8/18/18 06:42


 


 


 Albuterol/


 Ipratropium


  (Albuterol/


 Ipratropium)  3 ml  Q4H  PRN


 HHN


 Shortness of Breath  8/18/18 10:45


 8/23/18 10:44   


 


 


 Amlodipine


 Besylate


  (Norvasc)  10 mg  DAILY


 ORAL


   8/18/18 09:00


 9/17/18 08:59  8/20/18 08:13


 


 


 Aspirin


  (ASA)  81 mg  DAILY


 ORAL


   8/18/18 09:00


 9/17/18 08:59  8/20/18 08:13


 


 


 Atorvastatin


 Calcium


  (Lipitor)  40 mg  BEDTIME


 ORAL


   8/18/18 21:00


 9/17/18 20:59  8/19/18 22:03


 


 


 Baclofen


  (Lioresal)  10 mg  THREE TIMES A DAY  PRN


 ORAL


 Muscle Spasm  8/18/18 03:15


 9/17/18 03:14  8/20/18 12:32


 


 


 Dextrose


  (Dextrose 50%)  25 ml  STAT  PRN


 IV


 Hypoglycemia  8/18/18 03:15


 9/17/18 03:14   


 


 


 Dextrose


  (Dextrose 50%)  50 ml  STAT  PRN


 IV


 Hypoglycemia  8/18/18 03:15


 9/17/18 03:14   


 


 


 Diphenhydramine


 HCl


  (Benadryl)  25 mg  BID  PRN


 ORAL


 Itching  8/18/18 03:15


 9/17/18 03:14   


 


 


 Famotidine


  (Pepcid)  20 mg  DAILY


 ORAL


   8/18/18 09:00


 9/17/18 08:59  8/20/18 08:13


 


 


 Gabapentin


  (Neurontin)  800 mg  THREE TIMES A  DAY


 ORAL


   8/18/18 09:00


 9/17/18 08:59  8/20/18 17:28


 


 


 Glipizide


  (Glucotrol)  5 mg  BIAC


 ORAL


   8/18/18 06:30


 9/17/18 06:29  8/20/18 17:28


 


 


 Heparin Sodium


  (Porcine)


  (Heparin 5000


 units/ml)  5,000 units  EVERY 8  HOURS


 SUBQ


   8/18/18 06:00


 9/17/18 05:59  8/20/18 06:05


 


 


 Insulin Aspart


  (NovoLOG)    BEFORE MEALS AND  HS


 SUBQ


   8/18/18 06:30


 9/17/18 06:29  8/20/18 17:29


 


 


 Lidocaine


  (Xylocaine 5%


 cream)  1 applic  PRN  PRN


 TOPIC


 Pain Scale (3-5)  8/18/18 03:15


 9/17/18 03:14   


 


 


 Lisinopril


  (Zestril)  10 mg  DAILY


 ORAL


   8/18/18 09:00


 9/17/18 08:59  8/20/18 08:13


 


 


 Metformin HCl


  (Glucophage)  500 mg  BID


 ORAL


   8/18/18 09:00


 9/17/18 08:59  8/20/18 17:27


 


 


 Morphine Sulfate


  (Morphine


 Sulfate)  2 mg  Q4H  PRN


 IVP


 For Pain  8/18/18 18:00


 8/25/18 17:59  8/20/18 08:15


 


 


 Nitroglycerin


  (Nitro-Bid)  1 inch  TID@0600,1200,1800


 TOPIC


   8/18/18 19:00


 9/17/18 18:59  8/20/18 17:28


 


 


 Ondansetron HCl


  (Zofran)  4 mg  Q4HR  PRN


 IVP


 Nausea & Vomiting  8/18/18 03:15


 9/17/18 03:14   


 


 


 Regadenoson


  (Lexiscan)  0.4 mg  ONCE  PRN


 IV


 STRESS TEST  8/18/18 18:15


 8/20/18 23:59   


 








Allergies:  


Coded Allergies:  


     No Known Allergies (Unverified , 8/21/15)


ROS Limited/Unobtainable:  No


Constitutional:  Reports: no symptoms


HEENT:  Reports: no symptoms


Cardiovascular:  Reports: chest pain


Respiratory:  Reports: no symptoms


Gastrointestinal/Abdominal:  Reports: no symptoms


Genitourinary:  Reports: no symptoms


Neurologic/Psychiatric:  Reports: no symptoms


Subjective


61 YO M admitted with chest pain.  Cover for Int Michael-Dr Sepulveda.  Await 

cardiolite stress test result.





Objective





Last Vital Signs








  Date Time  Temp Pulse Resp B/P (MAP) Pulse Ox O2 Delivery O2 Flow Rate FiO2


 


8/20/18 17:28    129/76    


 


8/20/18 16:00  77      


 


8/20/18 16:00 98.1  20  95   





 98.1       


 


8/20/18 09:00      Room Air  


 


8/20/18 08:25        21











Laboratory Tests








Test


  8/20/18


06:30


 


White Blood Count


  5.6 K/UL


(4.8-10.8)


 


Red Blood Count


  3.75 M/UL


(4.70-6.10)  L


 


Hemoglobin


  12.2 G/DL


(14.2-18.0)  L


 


Hematocrit


  34.9 %


(42.0-52.0)  L


 


Mean Corpuscular Volume 93 FL (80-99)  


 


Mean Corpuscular Hemoglobin


  32.4 PG


(27.0-31.0)  H


 


Mean Corpuscular Hemoglobin


Concent 34.8 G/DL


(32.0-36.0)


 


Red Cell Distribution Width


  11.2 %


(11.6-14.8)  L


 


Platelet Count


  177 K/UL


(150-450)


 


Mean Platelet Volume


  11.1 FL


(6.5-10.1)  H


 


Neutrophils (%) (Auto)


  55.6 %


(45.0-75.0)


 


Lymphocytes (%) (Auto)


  31.9 %


(20.0-45.0)


 


Monocytes (%) (Auto)


  8.0 %


(1.0-10.0)


 


Eosinophils (%) (Auto)


  3.6 %


(0.0-3.0)  H


 


Basophils (%) (Auto)


  1.0 %


(0.0-2.0)


 


Sodium Level


  141 MMOL/L


(136-145)


 


Potassium Level


  3.8 MMOL/L


(3.5-5.1)


 


Chloride Level


  105 MMOL/L


()


 


Carbon Dioxide Level


  30 MMOL/L


(21-32)


 


Anion Gap


  6 mmol/L


(5-15)


 


Blood Urea Nitrogen


  12 mg/dL


(7-18)


 


Creatinine


  1.1 MG/DL


(0.55-1.30)


 


Estimat Glomerular Filtration


Rate > 60 mL/min


(>60)


 


Glucose Level


  176 MG/DL


()  H


 


Calcium Level


  8.7 MG/DL


(8.5-10.1)

















Intake and Output  


 


 8/19/18 8/20/18





 19:00 07:00


 


Intake Total 520 ml 


 


Output Total 600 ml 1000 ml


 


Balance -80 ml -1000 ml


 


  


 


Intake Oral 520 ml 


 


Output Urine Total 600 ml 1000 ml


 


# Voids  3








Objective


General Appearance:  WD/WN, no apparent distress, alert, obese


EENT:  PERRL/EOMI, normal ENT inspection


Neck:  non-tender, normal alignment, supple, normal inspection


Cardiovascular:  normal peripheral pulses, normal rate, regular rhythm, no 

gallop/murmur, no JVD


Respiratory/Chest:  chest wall non-tender, lungs clear, normal breath sounds, 

no respiratory distress, no accessory muscle use


Abdomen:  normal bowel sounds, non tender, soft, no organomegaly, no mass


Extremities:  normal range of motion, non-tender


Neurologic:  CNs II-XII grossly normal, no motor/sensory deficits


Skin:  normal pigmentation, warm/dry





Assessment/Plan


Problem List:  


(1) Hypercholesteremia


(2) Chest pain


Assessment & Plan:  See cardiology note.  Await cardiolite stress test result





(3) CAD (coronary artery disease)


(4) DM (diabetes mellitus)


Assessment & Plan:  Continue novolog sliding scale.





(5) HTN (hypertension)


Assessment & Plan:  Continue lisinopril and norvasc





(6) Asthma


Status:  stable











Lui Bustillo MD Aug 20, 2018 19:20

## 2018-08-20 NOTE — CARDIOLOGY PROGRESS NOTE
Assessment/Plan


Status:  stable


Assessment/Plan


Assessment


(1) Hypercholesteremia


(2) Chest pain


(3) CAD (coronary artery disease)


(4) DM (diabetes mellitus)


(5) HTN (hypertension)


(6) Asthma





Plan:


Echo reviewed, preserved LV function diastolic dysfunction, no wall motion 

abnormalities


Aspirin


Nitro SL prn


Statin


Serial EKG/Troponin


Cardiolite in AM


Continue blood pressure medications


hold BB for stress test


GI cocktail


Outpatient endoscopy


Evaluate for H pylori





Subjective


Cardiovascular:  Reports: no symptoms


Respiratory:  Reports: no symptoms


Gastrointestinal/Abdominal:  Reports: no symptoms


Genitourinary:  Reports: no symptoms


Subjective


NO acute events, vitals stable, had chest pain last night that resolved with 

morphine, for stress test in AM





Objective





Last 24 Hour Vital Signs








  Date Time  Temp Pulse Resp B/P (MAP) Pulse Ox O2 Delivery O2 Flow Rate FiO2


 


8/20/18 09:00      Room Air  


 


8/20/18 08:25  82 18   Room Air  21


 


8/20/18 08:13  67  147/82    


 


8/20/18 08:13    147/82    


 


8/20/18 08:00 97.5 67 20 146/81 (102) 97   





 97.5       


 


8/20/18 08:00  77      


 


8/20/18 06:52    152/88 (109)    


 


8/20/18 06:46    152/88    


 


8/20/18 04:00  72      


 


8/20/18 04:00 97.4 75 20 104/60 (75) 94   





 97.4       


 


8/20/18 00:00 97.4 73 20 150/73 (98) 96   





 97.4       


 


8/20/18 00:00  75      


 


8/19/18 22:30  79 18   Room Air  21


 


8/19/18 21:00 97.9 85 20 152/74 (100) 94   





 97.9       


 


8/19/18 21:00      Room Air  


 


8/19/18 20:00  86      


 


8/19/18 18:20    148/73    


 


8/19/18 16:00 97.7 85 22 148/73 (98) 97   





 97.7       


 


8/19/18 15:49  75      


 


8/19/18 12:12    126/72    


 


8/19/18 12:00 97.7 84 20 126/72 (90) 99   





 97.7       


 


8/19/18 11:55  84      








General Appearance:  no apparent distress, alert


EENT:  PERRL/EOMI, normal ENT inspection


Neck:  non-tender, normal alignment


Rhythm:  NSR


Cardiovascular:  normal peripheral pulses, normal rate, regular rhythm


Respiratory/Chest:  chest wall non-tender, lungs clear


Abdomen:  normal bowel sounds, non tender


Extremities:  normal range of motion, non-tender


Neurologic:  CNs II-XII grossly normal, no motor/sensory deficits











Intake and Output  


 


 8/19/18 8/20/18





 19:00 07:00


 


Intake Total 520 ml 


 


Output Total 600 ml 1000 ml


 


Balance -80 ml -1000 ml


 


  


 


Intake Oral 520 ml 


 


Output Urine Total 600 ml 1000 ml


 


# Voids  3











Laboratory Tests








Test


  8/20/18


06:30


 


White Blood Count


  5.6 K/UL


(4.8-10.8)


 


Red Blood Count


  3.75 M/UL


(4.70-6.10)  L


 


Hemoglobin


  12.2 G/DL


(14.2-18.0)  L


 


Hematocrit


  34.9 %


(42.0-52.0)  L


 


Mean Corpuscular Volume 93 FL (80-99)  


 


Mean Corpuscular Hemoglobin


  32.4 PG


(27.0-31.0)  H


 


Mean Corpuscular Hemoglobin


Concent 34.8 G/DL


(32.0-36.0)


 


Red Cell Distribution Width


  11.2 %


(11.6-14.8)  L


 


Platelet Count


  177 K/UL


(150-450)


 


Mean Platelet Volume


  11.1 FL


(6.5-10.1)  H


 


Neutrophils (%) (Auto)


  55.6 %


(45.0-75.0)


 


Lymphocytes (%) (Auto)


  31.9 %


(20.0-45.0)


 


Monocytes (%) (Auto)


  8.0 %


(1.0-10.0)


 


Eosinophils (%) (Auto)


  3.6 %


(0.0-3.0)  H


 


Basophils (%) (Auto)


  1.0 %


(0.0-2.0)


 


Sodium Level


  141 MMOL/L


(136-145)


 


Potassium Level


  3.8 MMOL/L


(3.5-5.1)


 


Chloride Level


  105 MMOL/L


()


 


Carbon Dioxide Level


  30 MMOL/L


(21-32)


 


Anion Gap


  6 mmol/L


(5-15)


 


Blood Urea Nitrogen


  12 mg/dL


(7-18)


 


Creatinine


  1.1 MG/DL


(0.55-1.30)


 


Estimat Glomerular Filtration


Rate > 60 mL/min


(>60)


 


Glucose Level


  176 MG/DL


()  H


 


Calcium Level


  8.7 MG/DL


(8.5-10.1)

















Mikey Okeefe M.D. Aug 20, 2018 11:55

## 2018-08-21 VITALS — DIASTOLIC BLOOD PRESSURE: 90 MMHG | SYSTOLIC BLOOD PRESSURE: 134 MMHG

## 2018-08-21 VITALS — SYSTOLIC BLOOD PRESSURE: 132 MMHG | DIASTOLIC BLOOD PRESSURE: 84 MMHG

## 2018-08-21 VITALS — DIASTOLIC BLOOD PRESSURE: 85 MMHG | SYSTOLIC BLOOD PRESSURE: 146 MMHG

## 2018-08-21 VITALS — SYSTOLIC BLOOD PRESSURE: 136 MMHG | DIASTOLIC BLOOD PRESSURE: 88 MMHG

## 2018-08-21 VITALS — SYSTOLIC BLOOD PRESSURE: 127 MMHG | DIASTOLIC BLOOD PRESSURE: 86 MMHG

## 2018-08-21 LAB
ADD MANUAL DIFF: NO
ANION GAP SERPL CALC-SCNC: 6 MMOL/L (ref 5–15)
BASOPHILS NFR BLD AUTO: 1.4 % (ref 0–2)
BUN SERPL-MCNC: 14 MG/DL (ref 7–18)
CALCIUM SERPL-MCNC: 8.6 MG/DL (ref 8.5–10.1)
CHLORIDE SERPL-SCNC: 104 MMOL/L (ref 98–107)
CO2 SERPL-SCNC: 30 MMOL/L (ref 21–32)
CREAT SERPL-MCNC: 1.2 MG/DL (ref 0.55–1.3)
EOSINOPHIL NFR BLD AUTO: 3.5 % (ref 0–3)
ERYTHROCYTE [DISTWIDTH] IN BLOOD BY AUTOMATED COUNT: 11.4 % (ref 11.6–14.8)
HCT VFR BLD CALC: 36.5 % (ref 42–52)
HGB BLD-MCNC: 12.3 G/DL (ref 14.2–18)
LYMPHOCYTES NFR BLD AUTO: 24.6 % (ref 20–45)
MCV RBC AUTO: 93 FL (ref 80–99)
MONOCYTES NFR BLD AUTO: 5.7 % (ref 1–10)
NEUTROPHILS NFR BLD AUTO: 64.8 % (ref 45–75)
PLATELET # BLD: 196 K/UL (ref 150–450)
POTASSIUM SERPL-SCNC: 4 MMOL/L (ref 3.5–5.1)
RBC # BLD AUTO: 3.93 M/UL (ref 4.7–6.1)
SODIUM SERPL-SCNC: 140 MMOL/L (ref 136–145)
WBC # BLD AUTO: 6.3 K/UL (ref 4.8–10.8)

## 2018-08-21 RX ADMIN — HEPARIN SODIUM SCH UNITS: 5000 INJECTION INTRAVENOUS; SUBCUTANEOUS at 13:34

## 2018-08-21 RX ADMIN — HEPARIN SODIUM SCH UNITS: 5000 INJECTION INTRAVENOUS; SUBCUTANEOUS at 06:00

## 2018-08-21 RX ADMIN — NITROGLYCERIN SCH INCH: 20 OINTMENT TOPICAL at 12:11

## 2018-08-21 RX ADMIN — LISINOPRIL SCH MG: 10 TABLET ORAL at 08:38

## 2018-08-21 RX ADMIN — INSULIN ASPART SCH UNITS: 100 INJECTION, SOLUTION INTRAVENOUS; SUBCUTANEOUS at 06:53

## 2018-08-21 RX ADMIN — INSULIN ASPART SCH UNITS: 100 INJECTION, SOLUTION INTRAVENOUS; SUBCUTANEOUS at 12:12

## 2018-08-21 RX ADMIN — NITROGLYCERIN SCH INCH: 20 OINTMENT TOPICAL at 06:00

## 2018-08-21 RX ADMIN — METFORMIN HYDROCHLORIDE SCH MG: 500 TABLET ORAL at 08:38

## 2018-08-21 RX ADMIN — GLIPIZIDE SCH MG: 5 TABLET ORAL at 06:40

## 2018-08-21 RX ADMIN — ASPIRIN 81 MG SCH MG: 81 TABLET ORAL at 08:38

## 2018-08-21 RX ADMIN — HYDROCODONE BITARTRATE AND ACETAMINOPHEN PRN TAB: 10; 325 TABLET ORAL at 08:45

## 2018-08-21 NOTE — PULMONOLOGY PROGRESS NOTE
Assessment/Plan


Problems:  


(1) ACS (acute coronary syndrome)


(2) Costochondritis


(3) LAURA (obstructive sleep apnea)


(4) Asthma


(5) CAD (coronary artery disease)


(6) HTN (hypertension)


(7) DM (diabetes mellitus)


Assessment/Plan


symptomatic treatment


stress test noted, was negative


echo reviewed


sliding scale





respiratory treatment


titrate fio2 to sat of 92%


dc home





Subjective


ROS Limited/Unobtainable:  No


Constitutional:  Reports: no symptoms


HEENT:  Repors: no symptoms


Respiratory:  Reports: no symptoms


Allergies:  


Coded Allergies:  


     No Known Allergies (Unverified , 8/21/15)





Objective





Last 24 Hour Vital Signs








  Date Time  Temp Pulse Resp B/P (MAP) Pulse Ox O2 Delivery O2 Flow Rate FiO2


 


8/21/18 10:14  79 18   Room Air  21


 


8/21/18 09:00      Room Air  


 


8/21/18 08:38    136/88    


 


8/21/18 08:37  94  136/88    


 


8/21/18 08:00 97.1 94 20 136/88 (104) 95   





 97.1       


 


8/21/18 04:00 98.6 77 24 132/84 (100) 97   





 98.6       


 


8/21/18 04:00  77      


 


8/21/18 00:00 97.9 80 20 146/85 (105) 97   





 97.9       


 


8/21/18 00:00  95      


 


8/20/18 22:06  86 20   Room Air  21


 


8/20/18 21:00      Room Air  


 


8/20/18 20:00 98.7 93 20 132/82 (99) 96   





 98.7       


 


8/20/18 20:00  84      


 


8/20/18 17:28    129/76    


 


8/20/18 16:00  77      


 


8/20/18 16:00 98.1 96 20 128/79 (95) 95   





 98.1       


 


8/20/18 12:25    129/76    

















Intake and Output  


 


 8/20/18 8/21/18





 19:00 07:00


 


Intake Total 240 ml 


 


Output Total 400 ml 


 


Balance -160 ml 


 


  


 


Intake Oral 240 ml 


 


Output Urine Total 400 ml 


 


# Voids 2 2








General Appearance:  WD/WN


HEENT:  normocephalic, atraumatic


Respiratory/Chest:  chest wall non-tender, no respiratory distress


Cardiovascular:  normal peripheral pulses, normal rate


Genitourinary:  normal external genitalia


Extremities:  no cyanosis


Skin:  no rash


Neurologic/Psychiatric:  CNs II-XII grossly normal


Lymphatic:  no neck adenopathy


Laboratory Tests


8/21/18 05:50: 


White Blood Count 6.3, Red Blood Count 3.93L, Hemoglobin 12.3L, Hematocrit 36.5L

, Mean Corpuscular Volume 93, Mean Corpuscular Hemoglobin 31.4H, Mean 

Corpuscular Hemoglobin Concent 33.8, Red Cell Distribution Width 11.4L, 

Platelet Count 196, Mean Platelet Volume 10.6H, Neutrophils (%) (Auto) 64.8, 

Lymphocytes (%) (Auto) 24.6, Monocytes (%) (Auto) 5.7, Eosinophils (%) (Auto) 

3.5H, Basophils (%) (Auto) 1.4, Sodium Level 140, Potassium Level 4.0, Chloride 

Level 104, Carbon Dioxide Level 30, Anion Gap 6, Blood Urea Nitrogen 14, 

Creatinine 1.2, Estimat Glomerular Filtration Rate > 60, Glucose Level 170H, 

Calcium Level 8.6, Troponin I 0.000





Current Medications








 Medications


  (Trade)  Dose


 Ordered  Sig/Mirian


 Route


 PRN Reason  Start Time


 Stop Time Status Last Admin


Dose Admin


 


 Acetaminophen


  (Tylenol)  650 mg  Q6H  PRN


 ORAL


 Mild Pain/Temp > 100.5  8/18/18 03:15


 9/17/18 03:14   


 


 


 Acetaminophen/


 Hydrocodone Bitart


  (Norco 10/325)  1 tab  Q6HR  PRN


 ORAL


 For Pain  8/18/18 03:15


 8/25/18 03:14  8/21/18 08:45


 


 


 Albuterol/


 Ipratropium


  (Albuterol/


 Ipratropium)  3 ml  Q4H  PRN


 HHN


 Shortness of Breath  8/18/18 10:45


 8/23/18 10:44   


 


 


 Amlodipine


 Besylate


  (Norvasc)  10 mg  DAILY


 ORAL


   8/18/18 09:00


 9/17/18 08:59  8/21/18 08:37


 


 


 Aspirin


  (ASA)  81 mg  DAILY


 ORAL


   8/18/18 09:00


 9/17/18 08:59  8/21/18 08:38


 


 


 Atorvastatin


 Calcium


  (Lipitor)  40 mg  BEDTIME


 ORAL


   8/21/18 21:00


 9/17/18 20:59   


 


 


 Baclofen


  (Lioresal)  10 mg  THREE TIMES A DAY  PRN


 ORAL


 Muscle Spasm  8/18/18 03:15


 9/17/18 03:14  8/20/18 12:32


 


 


 Dextrose


  (Dextrose 50%)  25 ml  STAT  PRN


 IV


 Hypoglycemia  8/18/18 03:15


 9/17/18 03:14   


 


 


 Dextrose


  (Dextrose 50%)  50 ml  STAT  PRN


 IV


 Hypoglycemia  8/18/18 03:15


 9/17/18 03:14   


 


 


 Diphenhydramine


 HCl


  (Benadryl)  25 mg  BID  PRN


 ORAL


 Itching  8/18/18 03:15


 9/17/18 03:14   


 


 


 Famotidine


  (Pepcid)  20 mg  DAILY


 ORAL


   8/18/18 09:00


 9/17/18 08:59  8/21/18 08:38


 


 


 Gabapentin


  (Neurontin)  800 mg  THREE TIMES A  DAY


 ORAL


   8/18/18 09:00


 9/17/18 08:59  8/21/18 08:37


 


 


 Glipizide


  (Glucotrol)  5 mg  BIAC


 ORAL


   8/18/18 06:30


 9/17/18 06:29  8/21/18 06:40


 


 


 Heparin Sodium


  (Porcine)


  (Heparin 5000


 units/ml)  5,000 units  EVERY 8  HOURS


 SUBQ


   8/18/18 06:00


 9/17/18 05:59  8/21/18 06:00


 


 


 Insulin Aspart


  (NovoLOG)    BEFORE MEALS AND  HS


 SUBQ


   8/18/18 06:30


 9/17/18 06:29  8/21/18 06:53


 


 


 Lidocaine


  (Xylocaine 5%


 cream)  1 applic  PRN  PRN


 TOPIC


 Pain Scale (3-5)  8/18/18 03:15


 9/17/18 03:14   


 


 


 Lisinopril


  (Zestril)  10 mg  DAILY


 ORAL


   8/18/18 09:00


 9/17/18 08:59  8/21/18 08:38


 


 


 Metformin HCl


  (Glucophage)  500 mg  BID


 ORAL


   8/18/18 09:00


 9/17/18 08:59  8/21/18 08:38


 


 


 Morphine Sulfate


  (Morphine


 Sulfate)  2 mg  Q4H  PRN


 IVP


 For Pain  8/18/18 18:00


 8/25/18 17:59  8/20/18 08:15


 


 


 Nitroglycerin


  (Nitro-Bid)  1 inch  TID@0600,1200,1800


 TOPIC


   8/18/18 19:00


 9/17/18 18:59  8/20/18 17:28


 


 


 Ondansetron HCl


  (Zofran)  4 mg  Q4HR  PRN


 IVP


 Nausea & Vomiting  8/18/18 03:15


 9/17/18 03:14   


 

















Ronaldo Bolanos MD Aug 21, 2018 12:13

## 2018-08-21 NOTE — PHYSICIAN QUERY
--------- THIS DOCUMENT IS A PERMANENT PART OF THE MEDICAL RECORD ---------

*****PLEASE COMPLETE THE DOCUMENT BEFORE SIGNING*****



Dear Dr. Okeefe                                   Date: 08/21/2018

/CDS' Name: Efrem Branham          /CDS Phone#: [phone]



Exercise your independent professional judgment when responding to query.  
Questions asked do not imply particular answer is desired or expected.  We 
greatly appreciate your clarification on this issue.



Clinical Documentation States: "Chest pain" documented in consult and progress 
notes. Consult and progress notes plan includes GI cocktail, outpatient 
endoscopy, evaluate for H. pylori. 



Clinical Findings Show:  Troponin = Negative               

                    ECHO = LV diastolic dysfunction.    



Please document the suspected etiology of Chest Pain:



a.Type:      [] Cardiac      [] Non-cardiac        [] Unspecified



b.Etiology - cardiac 

   

   [] Aortic dissection                    []Mitral valve prolapsed      

   [] Acute myocardial infarction               []Spasm of coronary arteries   

   [] Coronary Artery Disease                    []Pericarditis   



c.Etiology - non-cardiac



   [] Anxiety                         []Pleurisy

   [] Cancer                          []Pneumonia, type _____________ 

   [] Costochondritis              []Pneumothorax      

   [] GERD/Esophagitis            []Pulmonary embolism   

   [] Unable to determine   

   []Other:___________________



Condition Present on Admission:       [] Yes                      [] No        
     []Clinically Undeterminable



Please also document in your Progress Notes and/or Discharge Summary and 
indicate if the condition was present on admission.
LORNAD

## 2018-08-21 NOTE — CARDIOLOGY PROGRESS NOTE
Assessment/Plan


Status:  stable


Assessment/Plan


Assessment


(1) Hypercholesteremia


(2) Chest pain


(3) CAD (coronary artery disease)


(4) DM (diabetes mellitus)


(5) HTN (hypertension)


(6) Asthma





Plan:


Echo reviewed, preserved LV function diastolic dysfunction, no wall motion 

abnormalities


Aspirin


Nitro SL prn


Statin


Serial EKG/Troponin


Continue blood pressure medications


Outpatient endoscopy


Stress test negative


d/c home





Subjective


Cardiovascular:  Reports: no symptoms


Respiratory:  Reports: no symptoms


Gastrointestinal/Abdominal:  Reports: no symptoms


Genitourinary:  Reports: no symptoms


Subjective


NO acute events, vitals stable,stress test negative for ischemia





Objective





Last 24 Hour Vital Signs








  Date Time  Temp Pulse Resp B/P (MAP) Pulse Ox O2 Delivery O2 Flow Rate FiO2


 


8/21/18 12:11    134/82    


 


8/21/18 10:14  79 18   Room Air  21


 


8/21/18 09:00      Room Air  


 


8/21/18 08:38    136/88    


 


8/21/18 08:37  94  136/88    


 


8/21/18 08:00 97.1 94 20 136/88 (104) 95   





 97.1       


 


8/21/18 04:00 98.6 77 24 132/84 (100) 97   





 98.6       


 


8/21/18 04:00  77      


 


8/21/18 00:00 97.9 80 20 146/85 (105) 97   





 97.9       


 


8/21/18 00:00  95      


 


8/20/18 22:06  86 20   Room Air  21


 


8/20/18 21:00      Room Air  


 


8/20/18 20:00 98.7 93 20 132/82 (99) 96   





 98.7       


 


8/20/18 20:00  84      


 


8/20/18 17:28    129/76    


 


8/20/18 16:00  77      


 


8/20/18 16:00 98.1 96 20 128/79 (95) 95   





 98.1       








General Appearance:  no apparent distress, alert


EENT:  PERRL/EOMI, normal ENT inspection


Neck:  non-tender, normal alignment


Rhythm:  NSR


Cardiovascular:  normal peripheral pulses, normal rate


Respiratory/Chest:  chest wall non-tender, lungs clear


Abdomen:  normal bowel sounds, non tender


Extremities:  normal range of motion, non-tender


Neurologic:  CNs II-XII grossly normal, no motor/sensory deficits











Intake and Output  


 


 8/20/18 8/21/18





 19:00 07:00


 


Intake Total 240 ml 


 


Output Total 400 ml 


 


Balance -160 ml 


 


  


 


Intake Oral 240 ml 


 


Output Urine Total 400 ml 


 


# Voids 2 2











Laboratory Tests








Test


  8/21/18


05:50


 


White Blood Count


  6.3 K/UL


(4.8-10.8)


 


Red Blood Count


  3.93 M/UL


(4.70-6.10)  L


 


Hemoglobin


  12.3 G/DL


(14.2-18.0)  L


 


Hematocrit


  36.5 %


(42.0-52.0)  L


 


Mean Corpuscular Volume 93 FL (80-99)  


 


Mean Corpuscular Hemoglobin


  31.4 PG


(27.0-31.0)  H


 


Mean Corpuscular Hemoglobin


Concent 33.8 G/DL


(32.0-36.0)


 


Red Cell Distribution Width


  11.4 %


(11.6-14.8)  L


 


Platelet Count


  196 K/UL


(150-450)


 


Mean Platelet Volume


  10.6 FL


(6.5-10.1)  H


 


Neutrophils (%) (Auto)


  64.8 %


(45.0-75.0)


 


Lymphocytes (%) (Auto)


  24.6 %


(20.0-45.0)


 


Monocytes (%) (Auto)


  5.7 %


(1.0-10.0)


 


Eosinophils (%) (Auto)


  3.5 %


(0.0-3.0)  H


 


Basophils (%) (Auto)


  1.4 %


(0.0-2.0)


 


Sodium Level


  140 MMOL/L


(136-145)


 


Potassium Level


  4.0 MMOL/L


(3.5-5.1)


 


Chloride Level


  104 MMOL/L


()


 


Carbon Dioxide Level


  30 MMOL/L


(21-32)


 


Anion Gap


  6 mmol/L


(5-15)


 


Blood Urea Nitrogen


  14 mg/dL


(7-18)


 


Creatinine


  1.2 MG/DL


(0.55-1.30)


 


Estimat Glomerular Filtration


Rate > 60 mL/min


(>60)


 


Glucose Level


  170 MG/DL


()  H


 


Calcium Level


  8.6 MG/DL


(8.5-10.1)


 


Troponin I


  0.000 ng/mL


(0.000-0.056)

















Mikey Okeefe M.D. Aug 21, 2018 13:26

## 2018-08-21 NOTE — INTERNAL MED PROGRESS NOTE
Subjective


Date of Service:  Aug 21, 2018


Physician Name


Bustillo,Lui


Attending Physician


Jesus Sepulveda MD





Current Medications








 Medications


  (Trade)  Dose


 Ordered  Sig/Mirian


 Route


 PRN Reason  Start Time


 Stop Time Status Last Admin


Dose Admin


 


 Acetaminophen


  (Tylenol)  650 mg  Q6H  PRN


 ORAL


 Mild Pain/Temp > 100.5  8/18/18 03:15


 9/17/18 03:14   


 


 


 Acetaminophen/


 Hydrocodone Bitart


  (Norco 10/325)  1 tab  Q6HR  PRN


 ORAL


 For Pain  8/18/18 03:15


 8/25/18 03:14  8/21/18 08:45


 


 


 Albuterol/


 Ipratropium


  (Albuterol/


 Ipratropium)  3 ml  Q4H  PRN


 HHN


 Shortness of Breath  8/18/18 10:45


 8/23/18 10:44   


 


 


 Amlodipine


 Besylate


  (Norvasc)  10 mg  DAILY


 ORAL


   8/18/18 09:00


 9/17/18 08:59  8/21/18 08:37


 


 


 Aspirin


  (ASA)  81 mg  DAILY


 ORAL


   8/18/18 09:00


 9/17/18 08:59  8/21/18 08:38


 


 


 Atorvastatin


 Calcium


  (Lipitor)  40 mg  BEDTIME


 ORAL


   8/21/18 21:00


 9/17/18 20:59   


 


 


 Baclofen


  (Lioresal)  10 mg  THREE TIMES A DAY  PRN


 ORAL


 Muscle Spasm  8/18/18 03:15


 9/17/18 03:14  8/20/18 12:32


 


 


 Dextrose


  (Dextrose 50%)  25 ml  STAT  PRN


 IV


 Hypoglycemia  8/18/18 03:15


 9/17/18 03:14   


 


 


 Dextrose


  (Dextrose 50%)  50 ml  STAT  PRN


 IV


 Hypoglycemia  8/18/18 03:15


 9/17/18 03:14   


 


 


 Diphenhydramine


 HCl


  (Benadryl)  25 mg  BID  PRN


 ORAL


 Itching  8/18/18 03:15


 9/17/18 03:14   


 


 


 Famotidine


  (Pepcid)  20 mg  DAILY


 ORAL


   8/18/18 09:00


 9/17/18 08:59  8/21/18 08:38


 


 


 Gabapentin


  (Neurontin)  800 mg  THREE TIMES A  DAY


 ORAL


   8/18/18 09:00


 9/17/18 08:59  8/21/18 12:10


 


 


 Glipizide


  (Glucotrol)  5 mg  BIAC


 ORAL


   8/18/18 06:30


 9/17/18 06:29  8/21/18 06:40


 


 


 Heparin Sodium


  (Porcine)


  (Heparin 5000


 units/ml)  5,000 units  EVERY 8  HOURS


 SUBQ


   8/18/18 06:00


 9/17/18 05:59  8/21/18 06:00


 


 


 Insulin Aspart


  (NovoLOG)    BEFORE MEALS AND  HS


 SUBQ


   8/18/18 06:30


 9/17/18 06:29  8/21/18 12:12


 


 


 Lidocaine


  (Xylocaine 5%


 cream)  1 applic  PRN  PRN


 TOPIC


 Pain Scale (3-5)  8/18/18 03:15


 9/17/18 03:14   


 


 


 Lisinopril


  (Zestril)  10 mg  DAILY


 ORAL


   8/18/18 09:00


 9/17/18 08:59  8/21/18 08:38


 


 


 Metformin HCl


  (Glucophage)  500 mg  BID


 ORAL


   8/18/18 09:00


 9/17/18 08:59  8/21/18 08:38


 


 


 Morphine Sulfate


  (Morphine


 Sulfate)  2 mg  Q4H  PRN


 IVP


 For Pain  8/18/18 18:00


 8/25/18 17:59  8/20/18 08:15


 


 


 Nitroglycerin


  (Nitro-Bid)  1 inch  TID@0600,1200,1800


 TOPIC


   8/18/18 19:00


 9/17/18 18:59  8/21/18 12:11


 


 


 Ondansetron HCl


  (Zofran)  4 mg  Q4HR  PRN


 IVP


 Nausea & Vomiting  8/18/18 03:15


 9/17/18 03:14   


 








Allergies:  


Coded Allergies:  


     No Known Allergies (Unverified , 8/21/15)


ROS Limited/Unobtainable:  No


Constitutional:  Reports: no symptoms


HEENT:  Reports: no symptoms


Cardiovascular:  Reports: no symptoms


Respiratory:  Reports: no symptoms


Gastrointestinal/Abdominal:  Reports: no symptoms


Genitourinary:  Reports: no symptoms


Neurologic/Psychiatric:  Reports: no symptoms


Subjective


61 YO M admitted with chest pain.  Cover for Int Michael-Dr Sepulveda.  Await 

cardiolite stress test result.





Objective





Last Vital Signs








  Date Time  Temp Pulse Resp B/P (MAP) Pulse Ox O2 Delivery O2 Flow Rate FiO2


 


8/21/18 16:00 97.5 75 20 127/86 (100) 96   





 97.5       


 


8/21/18 10:14      Room Air  21











Laboratory Tests








Test


  8/21/18


05:50


 


White Blood Count


  6.3 K/UL


(4.8-10.8)


 


Red Blood Count


  3.93 M/UL


(4.70-6.10)  L


 


Hemoglobin


  12.3 G/DL


(14.2-18.0)  L


 


Hematocrit


  36.5 %


(42.0-52.0)  L


 


Mean Corpuscular Volume 93 FL (80-99)  


 


Mean Corpuscular Hemoglobin


  31.4 PG


(27.0-31.0)  H


 


Mean Corpuscular Hemoglobin


Concent 33.8 G/DL


(32.0-36.0)


 


Red Cell Distribution Width


  11.4 %


(11.6-14.8)  L


 


Platelet Count


  196 K/UL


(150-450)


 


Mean Platelet Volume


  10.6 FL


(6.5-10.1)  H


 


Neutrophils (%) (Auto)


  64.8 %


(45.0-75.0)


 


Lymphocytes (%) (Auto)


  24.6 %


(20.0-45.0)


 


Monocytes (%) (Auto)


  5.7 %


(1.0-10.0)


 


Eosinophils (%) (Auto)


  3.5 %


(0.0-3.0)  H


 


Basophils (%) (Auto)


  1.4 %


(0.0-2.0)


 


Sodium Level


  140 MMOL/L


(136-145)


 


Potassium Level


  4.0 MMOL/L


(3.5-5.1)


 


Chloride Level


  104 MMOL/L


()


 


Carbon Dioxide Level


  30 MMOL/L


(21-32)


 


Anion Gap


  6 mmol/L


(5-15)


 


Blood Urea Nitrogen


  14 mg/dL


(7-18)


 


Creatinine


  1.2 MG/DL


(0.55-1.30)


 


Estimat Glomerular Filtration


Rate > 60 mL/min


(>60)


 


Glucose Level


  170 MG/DL


()  H


 


Calcium Level


  8.6 MG/DL


(8.5-10.1)


 


Troponin I


  0.000 ng/mL


(0.000-0.056)

















Intake and Output  


 


 8/20/18 8/21/18





 19:00 07:00


 


Intake Total 240 ml 


 


Output Total 400 ml 


 


Balance -160 ml 


 


  


 


Intake Oral 240 ml 


 


Output Urine Total 400 ml 


 


# Voids 2 2








Objective


General Appearance:  WD/WN, no apparent distress, alert, obese


EENT:  PERRL/EOMI, normal ENT inspection


Neck:  non-tender, normal alignment, supple, normal inspection


Cardiovascular:  normal peripheral pulses, normal rate, regular rhythm, no 

gallop/murmur, no JVD


Respiratory/Chest:  chest wall non-tender, lungs clear, normal breath sounds, 

no respiratory distress, no accessory muscle use


Abdomen:  normal bowel sounds, non tender, soft, no organomegaly, no mass


Extremities:  normal range of motion, non-tender


Neurologic:  CNs II-XII grossly normal, no motor/sensory deficits


Skin:  normal pigmentation, warm/dry





Assessment/Plan


Problem List:  


(1) Hypercholesteremia


(2) Chest pain


Assessment & Plan:  See cardiology note.  Cardiolite stress test = neg





(3) CAD (coronary artery disease)


(4) DM (diabetes mellitus)


Assessment & Plan:  Continue novolog sliding scale.





(5) HTN (hypertension)


Assessment & Plan:  Continue lisinopril and norvasc





(6) Asthma


Status:  stable


Assessment/Plan


Discharge home today











Lui Bustillo MD Aug 21, 2018 18:10

## 2018-08-21 NOTE — DIAGNOSTIC IMAGING REPORT
Indications: Chest pain

 

Technique: Single day single isotope protocol utilized. Initially, resting images

obtained using IV administration 10.9 millicuries 99M technetium Myoview.

Subsequently, patient underwent  lexiscan stress testing. See cardiology report for

details. During Lexiscan infusion, IV administration 32 mCi 99 M technetium Myoview.

SPECT and planar images obtained. SPECT images gated to 8 phases of the cardiac cycle

were also obtained, and reformatted into cine images for evaluation of ejection

fraction.

 

Comparison: 8/25/2015

 

Findings: Presence or absence of symptoms during infusion is not described on the

cardiology report. Per cardiology report, resting EKG demonstrates normal sinus

rhythm. Presence or absence of ST changes is not described.  On imaging, no fixed nor

reversible perfusion defects are demonstrated. When compared to prior exam, no

significant interim change. Apparent fixed inferior wall perfusion is less striking

than on the prior exam, probably on the basis of diaphragmatic attenuation on both

current and the previous studies. Normal left ventricular chamber size. Calculated

post stress ejection fraction 71%. No focal wall motion abnormality demonstrated.

 

Impression: Nonischemic clinical response to pharmacologic stress, per cardiology

report

 

Nonischemic electrocardiographic response to pharmacologic stress, per cardiology

report

 

No imaging findings to suggest ischemia, at level of stress achieved.

 

Calculated post stress ejection fraction greater than 70%

## 2018-08-21 NOTE — GENERAL PROGRESS NOTE
Assessment/Plan


Status:  stable


Assessment/Plan


anxiety d/o





-ativan prn 


-provided ro/st





Subjective


Date patient seen:  Aug 21, 2018


Neurologic/Psychiatric:  Reports: anxiety, depressed, emotional problems


Allergies:  


Coded Allergies:  


     No Known Allergies (Unverified , 8/21/15)





Objective





Last 24 Hour Vital Signs








  Date Time  Temp Pulse Resp B/P (MAP) Pulse Ox O2 Delivery O2 Flow Rate FiO2


 


8/21/18 16:00 97.5 75 20 127/86 (100) 96   





 97.5       


 


8/21/18 16:00  92      


 


8/21/18 12:11    134/82    


 


8/21/18 12:00 97.2 80 20 134/90 (105) 96   





 97.2       


 


8/21/18 12:00  80      


 


8/21/18 10:14  79 18   Room Air  21


 


8/21/18 09:00      Room Air  


 


8/21/18 08:38    136/88    


 


8/21/18 08:37  94  136/88    


 


8/21/18 08:00 97.1 94 20 136/88 (104) 95   





 97.1       


 


8/21/18 08:00  88      


 


8/21/18 04:00 98.6 77 24 132/84 (100) 97   





 98.6       


 


8/21/18 04:00  77      


 


8/21/18 00:00 97.9 80 20 146/85 (105) 97   





 97.9       


 


8/21/18 00:00  95      


 


8/20/18 22:06  86 20   Room Air  21


 


8/20/18 21:00      Room Air  


 


8/20/18 20:00 98.7 93 20 132/82 (99) 96   





 98.7       


 


8/20/18 20:00  84      

















Intake and Output  


 


 8/20/18 8/21/18





 19:00 07:00


 


Intake Total 240 ml 


 


Output Total 400 ml 


 


Balance -160 ml 


 


  


 


Intake Oral 240 ml 


 


Output Urine Total 400 ml 


 


# Voids 2 2








Laboratory Tests


8/21/18 05:50: 


White Blood Count 6.3, Red Blood Count 3.93L, Hemoglobin 12.3L, Hematocrit 36.5L

, Mean Corpuscular Volume 93, Mean Corpuscular Hemoglobin 31.4H, Mean 

Corpuscular Hemoglobin Concent 33.8, Red Cell Distribution Width 11.4L, 

Platelet Count 196, Mean Platelet Volume 10.6H, Neutrophils (%) (Auto) 64.8, 

Lymphocytes (%) (Auto) 24.6, Monocytes (%) (Auto) 5.7, Eosinophils (%) (Auto) 

3.5H, Basophils (%) (Auto) 1.4, Sodium Level 140, Potassium Level 4.0, Chloride 

Level 104, Carbon Dioxide Level 30, Anion Gap 6, Blood Urea Nitrogen 14, 

Creatinine 1.2, Estimat Glomerular Filtration Rate > 60, Glucose Level 170H, 

Calcium Level 8.6, Troponin I 0.000


Height (Feet):  5


Height (Inches):  10.00


Weight (Pounds):  260


General Appearance:  no apparent distress, alert


Neurologic:  oriented x 3, responsive, depressed affect











Josseline Bowman MD Aug 21, 2018 18:36

## 2018-08-23 NOTE — DISCHARGE SUMMARY
Discharge Summary


Discharge Summary


_


DATE OF ADMISSION: 08/18/2018





DATE OF DISCHARGE: 08/21/2018 





REASON FOR ADMISSION: 


63 years old male with past medical history significant for hypertension, 

diabetes mellitus, coronary artery disease, questionable myocardial infarction, 

hyperlipidemia, cerebrovascular accident, asthma, initially presented to [facility] with   complaint of chest pain.  


Upon evaluation troponin was negative.  


EKG revealed normal sinus rhythm,  no acute ischemic changes.  


Noted elevated d-dimer.  


Chest x-ray was unremarkable.  


Patient was transferred to [facility]  for further management with 

diagnosis of   chest pain , rule out acute coronary syndrome.


 


CONSULTANTS:


cardiologist.  Dr. Okeefe


pulmonary Dr. Bolanos


psychiatrist dr Bowman 


 


[facility] COURSE: 


Patient admitted to telemetry floor.  


Serial troponin were negative.  


EKG revealed no acute ischemic changes. 


Patient was ruled out for acute myocardiac infarction.


Echocardiogram revealed preserved ejection fraction 55-60%.  No evidence of 

ventricular hypertrophy.  No evidence of pericardial effusion.  Right 

ventricular systolic pressure of 17.  


Cardiologist closely followed.  


Venous duplex bilateral lower extremity was negative for acute DVT.  





Antiplatelet therapy with aspirin provided.  Statin was continued.


Nitroglycerin started topically . 


Blood pressure was managed with calcium channel blocker and ACE inhibitor .


DVT and GI prophylaxis provided .


Myocardial perfusion scan was nonischemic with calculated ejection fraction of 

70%.


Chest pain was likely secondary to costochondritis.  


Supplemental oxygen provided as needed to keep pulse oximetry above 92%.  


Pulmonary toilet provided as needed.


BiPAP was provided at night and as needed 


 


Patient was complaining of epigastric pain .


GI cocktail provided with relief.


Recommended outpatient endoscopy   with evaluation for H pylori.  


Blood sugar was managed with oral  anti-glycemic and sliding scale of  insulin 

as needed.  


Hemoglobin A1c -8 , not at goal.  


Patient will need further optimization of anti-glycemic regimen as outpatient.  

.


Bowel regimen instituted. 


Supportive care provided . 


Pain management  as outpatient was  recommended  with nonsteroid anti-

inflammatory medications


 


Psychiatrist closely followed, and diagnosed patient with anxiety disorder.


Reality orientation and  supportive therapy provided.


Patient was stable for discharge.  


Sleep study as outpatient recommended 





FINAL DIAGNOSES: 


Chest pain 


Probably  costochondritis 


Asthma 


Coronary artery disease 


Hypertension 


Hyperlipidemia 


Diabetes mellitus 


ETOH use,  possible abuse 


Probable obstructive sleep apnea 


Anxiety disorder





DISCHARGE MEDICATIONS:


See Medication Reconciliation list.





DISCHARGE INSTRUCTIONS:


Patient was discharged home . 


Follow up with primary care provider in one week. 


Sleep study as outpatient recommended.











Charleen Chery NP Aug 23, 2018 15:04

## 2020-11-10 NOTE — CARDIOLOGY REPORT
--------------- APPROVED REPORT --------------





EKG Measurement

Heart [id number]

OR [id number]

[id number]

[id number]

[id number]





Normal sinus rhythm

Incomplete right bundle branch block

Nonspecific T wave abnormality

Abnormal ECG
--------------- APPROVED REPORT --------------





EKG Measurement

Heart [id number]

IN 

[id number]

[id number]

[id number]

[id number]





Normal sinus rhythm

Left axis deviation

T wave abnormality, consider anterolateral ischemia

Abnormal ECG
--------------- APPROVED REPORT --------------





EXAM: Two-dimensional and M-mode echocardiogram with Doppler and color 

Doppler.



INDICATION

Chest Pain 



M-Mode DIMENSIONS 

IVSd1.5 (0.7-1.1cm)Left Atrium (MM)4.0 (1.6-4.0cm)

LVDd4.7 (3.5-5.6cm)Aortic Root3.3 (2.0-3.7cm)

PWd1.7 (0.7-1.1cm)Aortic Cusp Exc.1.7 (1.5-2.0cm)

IVSs1.4 cm

LVDs3.3 (2.5-4.0cm)

PWs2.1 cm





Technically difficult study due to poor acoustinal windows .

Normal left ventricular chamber size, systolic function and wall motion to extent 

visualized.

Left ventricular ejection fraction estimated to be  55-60% .

No evidence of ventricular hypertrophy .

No evidence of   pericardial effusion. 

Mild left atrial enlargments .

Right cardiac chamber sizes are within normal limits .

Focal aortic valve sclerosis with normal cusp excursion.

Thickened mitral valve leaflets with normal excursion.

Mitral annulus and aortic root calcification.

Pulmonic valve not well visualized.

Normal tricuspid valve structure. 

IVC at  size2.2 with physiological collapse .



A  color flow and spectral Doppler study was performed and revealed:

No  aortic regurgitation.

Trace    mitral regurgitation.

Normal left ventricular diastolic function .

Trace  tricuspid regurgitation.

Tricuspid  systolic velocities suggests peak right ventricular systolic pressure of 

17mmHg
Detail Level: Generalized
Quality 130: Documentation Of Current Medications In The Medical Record: Current Medications Documented
Quality 226: Preventive Care And Screening: Tobacco Use: Screening And Cessation Intervention: Patient screened for tobacco use and is an ex/non-smoker